# Patient Record
Sex: FEMALE | Race: WHITE | Employment: UNEMPLOYED | URBAN - METROPOLITAN AREA
[De-identification: names, ages, dates, MRNs, and addresses within clinical notes are randomized per-mention and may not be internally consistent; named-entity substitution may affect disease eponyms.]

---

## 2017-01-04 ENCOUNTER — OFFICE VISIT (OUTPATIENT)
Dept: OBGYN CLINIC | Facility: CLINIC | Age: 42
End: 2017-01-04

## 2017-01-04 ENCOUNTER — HOSPITAL ENCOUNTER (OUTPATIENT)
Dept: MAMMOGRAPHY | Facility: HOSPITAL | Age: 42
Discharge: HOME | End: 2017-01-04
Attending: OBSTETRICS & GYNECOLOGY
Payer: COMMERCIAL

## 2017-01-04 VITALS
HEIGHT: 61 IN | WEIGHT: 135 LBS | HEART RATE: 68 BPM | DIASTOLIC BLOOD PRESSURE: 63 MMHG | BODY MASS INDEX: 25.49 KG/M2 | SYSTOLIC BLOOD PRESSURE: 95 MMHG

## 2017-01-04 DIAGNOSIS — Z01.419 ENCOUNTER FOR GYNECOLOGICAL EXAMINATION: Primary | ICD-10-CM

## 2017-01-04 DIAGNOSIS — Z12.31 ENCOUNTER FOR SCREENING MAMMOGRAM FOR BREAST CANCER: ICD-10-CM

## 2017-01-04 PROCEDURE — 99385 PREV VISIT NEW AGE 18-39: CPT | Performed by: OBSTETRICS & GYNECOLOGY

## 2017-01-04 PROCEDURE — 77067 SCR MAMMO BI INCL CAD: CPT

## 2017-01-05 LAB — HPV I/H RISK 1 DNA SPEC QL NAA+PROBE: NEGATIVE

## 2017-01-06 NOTE — PROGRESS NOTES
Sheela Gomez is a 46year old female [place] [address] Patient's last menstrual period was 12/26/2016. here for annual exam.       New pt. Had 1st myomectomy at age 27. Had 4 myomectomies prior to hysterectomy. Had supracervical hysterectomy.   Gets monthly period kg)    Body mass index is 25.52 kg/(m^2).     Constitutional: well developed, well nourished  Neck/Thyroid: thyroid symmetric, no nodules  Heart:  Regular rate and rhythm  Lungs:  Clear to asculation  Breast: normal without palpable masses, tenderness, asym

## 2017-01-12 NOTE — PROGRESS NOTES
Quick Note:    Normal pap and negative HPV. But return to clinic in 1 year for annual. Send letter.   ______

## 2017-01-14 ENCOUNTER — HOSPITAL ENCOUNTER (OUTPATIENT)
Age: 42
Discharge: HOME | End: 2017-01-14
Payer: COMMERCIAL

## 2017-01-14 VITALS
SYSTOLIC BLOOD PRESSURE: 92 MMHG | TEMPERATURE: 98 F | OXYGEN SATURATION: 97 % | RESPIRATION RATE: 18 BRPM | DIASTOLIC BLOOD PRESSURE: 60 MMHG | HEART RATE: 68 BPM

## 2017-01-14 DIAGNOSIS — K11.20 PAROTITIS: Primary | ICD-10-CM

## 2017-01-14 PROCEDURE — 99204 OFFICE O/P NEW MOD 45 MIN: CPT

## 2017-01-14 PROCEDURE — 99213 OFFICE O/P EST LOW 20 MIN: CPT

## 2017-01-14 RX ORDER — AMOXICILLIN AND CLAVULANATE POTASSIUM 875; 125 MG/1; MG/1
1 TABLET, FILM COATED ORAL 2 TIMES DAILY
Qty: 20 TABLET | Refills: 0 | Status: SHIPPED | OUTPATIENT
Start: 2017-01-14 | End: 2017-01-24

## 2017-01-14 NOTE — ED INITIAL ASSESSMENT (HPI)
REPORTS LEFT SIDED FACIAL SWELLING AND PAIN SINCE THIS AM.  NO REDNESS NOTED. DENIES DENTAL PAIN. REPORTS MINOR EAR PAIN. PATIENT REPORTS RECENT URI.

## 2017-01-14 NOTE — ED PROVIDER NOTES
Patient presents with:  Swelling Edema (cardiovascular, metabolic)      HPI:     Ladonna Simon is a 46year old female who presents for evaluation and management of a chief complaint of left facial swelling and pain over the parotid gland.   She states she w area.  NECK: supple, no adenopathy  CARDIO: RRR without murmur  EXTREMITIES: no cyanosis, edema, BETH without difficulty  HEAD: normocephalic  EYES: sclera non icteric bilateral, conjunctiva clear  EARS: TM  bilateral: normal No mastoid redness, pain, or sw

## 2017-09-07 ENCOUNTER — HOSPITAL ENCOUNTER (OUTPATIENT)
Age: 42
Discharge: HOME | End: 2017-09-07
Attending: FAMILY MEDICINE
Payer: COMMERCIAL

## 2017-09-07 VITALS
WEIGHT: 128 LBS | OXYGEN SATURATION: 98 % | SYSTOLIC BLOOD PRESSURE: 99 MMHG | RESPIRATION RATE: 16 BRPM | TEMPERATURE: 98 F | HEIGHT: 61 IN | DIASTOLIC BLOOD PRESSURE: 63 MMHG | HEART RATE: 71 BPM | BODY MASS INDEX: 24.17 KG/M2

## 2017-09-07 DIAGNOSIS — J02.0 STREPTOCOCCAL SORE THROAT: Primary | ICD-10-CM

## 2017-09-07 LAB — S PYO AG THROAT QL: POSITIVE

## 2017-09-07 PROCEDURE — 87430 STREP A AG IA: CPT

## 2017-09-07 PROCEDURE — 99214 OFFICE O/P EST MOD 30 MIN: CPT

## 2017-09-07 PROCEDURE — 99213 OFFICE O/P EST LOW 20 MIN: CPT

## 2017-09-07 RX ORDER — AZITHROMYCIN 250 MG/1
TABLET, FILM COATED ORAL
Qty: 1 PACKAGE | Refills: 0 | Status: SHIPPED | OUTPATIENT
Start: 2017-09-07 | End: 2017-09-12

## 2017-09-07 NOTE — ED PROVIDER NOTES
Patient presents with:  Sore Throat    HPI:     Neda Hancock is a 46year old female who presents with for chief complaint of sore throat. Onset of symptoms was today.     The patient denies complaints of headache, neck pain, ear pain, swollen glands, diff normal. [id number] midline. Mucosa normal. No drainage or sinus tenderness. . No nasal flaring  Throat: lips, mucosa, and tongue normal; teeth and gums normal  Neck: no adenopathy  Lungs: clear to auscultation bilaterally. No chest wall retractions.  No respira

## 2017-09-07 NOTE — ED INITIAL ASSESSMENT (HPI)
PATIENT ARRIVED AMBULATORY TO ROOM C/O A SORE THROAT. PATIENT STATES THE SORE THROAT STARTED TODAY. PATIENT STATES \"ALL OF MY FAMILY HAS STREP\" +FEVERS. NO N/V/D.

## 2017-10-12 ENCOUNTER — HOSPITAL ENCOUNTER (OUTPATIENT)
Age: 42
Discharge: HOME | End: 2017-10-12
Payer: COMMERCIAL

## 2017-10-12 VITALS
HEIGHT: 61 IN | HEART RATE: 79 BPM | TEMPERATURE: 98 F | OXYGEN SATURATION: 100 % | RESPIRATION RATE: 16 BRPM | SYSTOLIC BLOOD PRESSURE: 104 MMHG | DIASTOLIC BLOOD PRESSURE: 77 MMHG | BODY MASS INDEX: 24.55 KG/M2 | WEIGHT: 130 LBS

## 2017-10-12 DIAGNOSIS — J02.9 PHARYNGITIS, UNSPECIFIED ETIOLOGY: Primary | ICD-10-CM

## 2017-10-12 PROCEDURE — 87430 STREP A AG IA: CPT

## 2017-10-12 PROCEDURE — 99213 OFFICE O/P EST LOW 20 MIN: CPT

## 2017-10-12 PROCEDURE — 99214 OFFICE O/P EST MOD 30 MIN: CPT

## 2017-10-12 RX ORDER — AMOXICILLIN 875 MG/1
875 TABLET, COATED ORAL 2 TIMES DAILY
Qty: 20 TABLET | Refills: 0 | Status: SHIPPED | OUTPATIENT
Start: 2017-10-12 | End: 2017-10-22

## 2017-10-12 NOTE — ED PROVIDER NOTES
Patient presents with:  Sore Throat      HPI:     Sherryle Coil is a 38year old female who presents for evaluation of a chief complaint of sore throat, congestion, ear pressure for the past 2 days. The patient states she is concerned she has strep throat. bilateral, conjunctiva clear  EARS: TM  bilateral: bulging  NOSE: nasal turbinates: swollen, red and clear drainage  THROAT: redness noted, uvula midline and airway patent  LUNGS: clear to auscultation bilaterally; no rales, rhonchi, or wheezes    MDM/Asse

## 2017-10-12 NOTE — ED INITIAL ASSESSMENT (HPI)
PATIENT ARRIVED AMBULATORY TO ROOM C/O A SORE THROAT STARTED YESTERDAY. PT DENIES N/V/D. DENIES COUGH. DENIES NASAL CONGESTION. NO FEVERS. PATIENT STATES \"I HAD STREP ABOUT A MONTH AGO\" EASY NON LABORED RESPIRATIONS.  NO DISTRESS

## 2017-11-01 ENCOUNTER — HOSPITAL ENCOUNTER (OUTPATIENT)
Age: 42
Discharge: HOME | End: 2017-11-01
Attending: FAMILY MEDICINE
Payer: COMMERCIAL

## 2017-11-01 VITALS
TEMPERATURE: 98 F | SYSTOLIC BLOOD PRESSURE: 100 MMHG | HEART RATE: 78 BPM | BODY MASS INDEX: 26.43 KG/M2 | WEIGHT: 140 LBS | HEIGHT: 61 IN | RESPIRATION RATE: 20 BRPM | DIASTOLIC BLOOD PRESSURE: 73 MMHG | OXYGEN SATURATION: 99 %

## 2017-11-01 DIAGNOSIS — J06.9 VIRAL UPPER RESPIRATORY TRACT INFECTION: Primary | ICD-10-CM

## 2017-11-01 PROCEDURE — 99212 OFFICE O/P EST SF 10 MIN: CPT

## 2017-11-01 NOTE — ED PROVIDER NOTES
Patient Seen in: 5 [facility]    History   Patient presents with:  Ear Problem Pain (neurosensory)    Stated Complaint: ear problem    HPI    Pt is a 39 yo who presents with a 1 day h/o nasal congestion and ear pressure.  No and regular rhythm. Pulmonary/Chest: Effort normal and breath sounds normal.   Neurological: She is alert and oriented to person, place, and time. Skin: Skin is warm. Psychiatric: She has a normal mood and affect.  Her behavior is normal.   Nursing n

## 2017-12-15 ENCOUNTER — HOSPITAL ENCOUNTER (OUTPATIENT)
Age: 42
Discharge: HOME | End: 2017-12-15
Attending: EMERGENCY MEDICINE
Payer: COMMERCIAL

## 2017-12-15 VITALS
TEMPERATURE: 99 F | DIASTOLIC BLOOD PRESSURE: 79 MMHG | SYSTOLIC BLOOD PRESSURE: 111 MMHG | HEART RATE: 73 BPM | HEIGHT: 61 IN | BODY MASS INDEX: 26.24 KG/M2 | OXYGEN SATURATION: 99 % | WEIGHT: 139 LBS | RESPIRATION RATE: 18 BRPM

## 2017-12-15 DIAGNOSIS — J06.9 UPPER RESPIRATORY TRACT INFECTION, UNSPECIFIED TYPE: Primary | ICD-10-CM

## 2017-12-15 PROCEDURE — 99212 OFFICE O/P EST SF 10 MIN: CPT

## 2017-12-15 NOTE — ED PROVIDER NOTES
Patient presents with:  Cough/URI      HPI:     Renée Christopher is a 38year old female who presents for evaluation of a chief complaint of  an ear pressure sensation Onset of symptoms was 3 weeks ago. The patient also complains of cough.   Patient states she antibiotics did not appear indicated advised symptomatic treatment with use of over-the-counter medications  No orders of the defined types were placed in this encounter.       Labs performed this visit:  No results found for this or any previous visit (fro

## 2018-02-10 ENCOUNTER — HOSPITAL ENCOUNTER (OUTPATIENT)
Dept: MAMMOGRAPHY | Facility: HOSPITAL | Age: 43
Discharge: HOME | End: 2018-02-10
Attending: OBSTETRICS & GYNECOLOGY
Payer: COMMERCIAL

## 2018-02-10 ENCOUNTER — OFFICE VISIT (OUTPATIENT)
Dept: OBGYN CLINIC | Facility: CLINIC | Age: 43
End: 2018-02-10

## 2018-02-10 VITALS
HEIGHT: 61 IN | HEART RATE: 72 BPM | SYSTOLIC BLOOD PRESSURE: 100 MMHG | BODY MASS INDEX: 26.62 KG/M2 | WEIGHT: 141 LBS | DIASTOLIC BLOOD PRESSURE: 66 MMHG

## 2018-02-10 DIAGNOSIS — Z01.419 ENCOUNTER FOR GYNECOLOGICAL EXAMINATION WITHOUT ABNORMAL FINDING: Primary | ICD-10-CM

## 2018-02-10 DIAGNOSIS — Z12.31 ENCOUNTER FOR SCREENING MAMMOGRAM FOR BREAST CANCER: ICD-10-CM

## 2018-02-10 PROCEDURE — 77067 SCR MAMMO BI INCL CAD: CPT | Performed by: OBSTETRICS & GYNECOLOGY

## 2018-02-10 PROCEDURE — 99396 PREV VISIT EST AGE 40-64: CPT | Performed by: OBSTETRICS & GYNECOLOGY

## 2018-02-10 NOTE — PROGRESS NOTES
Well Woman Exam    HPI:  The patient is a 44yo female who presents for annual exam. Pt has no complaints. S/p Supracervical hyst and has irregular light menses. Pt has had multiple myomectomies as well and ended up adopting her two children.  Denies gyn con back pain. Skin/Breast:  Denies any breast pain, lumps, or discharge. Neurological:  denies headaches, extremity weakness  Psychiatric: denies depression or anxiety.        02/10/18  1043   BP: 100/66   Pulse: 72       PHYSICAL EXAM:   Constitutional: we

## 2018-05-05 ENCOUNTER — HOSPITAL ENCOUNTER (OUTPATIENT)
Age: 43
Discharge: HOME | End: 2018-05-05
Attending: FAMILY MEDICINE
Payer: COMMERCIAL

## 2018-05-05 VITALS
RESPIRATION RATE: 18 BRPM | HEIGHT: 61 IN | WEIGHT: 135 LBS | DIASTOLIC BLOOD PRESSURE: 72 MMHG | BODY MASS INDEX: 25.49 KG/M2 | OXYGEN SATURATION: 99 % | TEMPERATURE: 98 F | HEART RATE: 71 BPM | SYSTOLIC BLOOD PRESSURE: 115 MMHG

## 2018-05-05 DIAGNOSIS — J02.0 STREPTOCOCCAL SORE THROAT: Primary | ICD-10-CM

## 2018-05-05 PROCEDURE — 99213 OFFICE O/P EST LOW 20 MIN: CPT

## 2018-05-05 PROCEDURE — 99214 OFFICE O/P EST MOD 30 MIN: CPT

## 2018-05-05 PROCEDURE — 87430 STREP A AG IA: CPT

## 2018-05-05 RX ORDER — AMOXICILLIN 875 MG/1
875 TABLET, COATED ORAL 2 TIMES DAILY
Qty: 20 TABLET | Refills: 0 | Status: SHIPPED | OUTPATIENT
Start: 2018-05-05 | End: 2018-05-15

## 2018-05-05 NOTE — ED PROVIDER NOTES
Patient presents with:  Sore Throat      HPI:     Gary Bain is a 38year old female who presents with for chief complaint of nasal congestion, sore throat, fatigue. X 3 days.     The patient denies complaints of  neck pain, ear pain, difficulty breathin respiratory distress.  No tachypnea noted  CARDIOVASCULAR:   Heart: S1, S2 normal, no murmur, click, rub or gallop, regular rate and rhythm  GI - Abdomen: soft, non-tender; bowel sounds normal; no masses,  no organomegaly  Skin: Skin color, texture, turgor

## 2018-05-05 NOTE — ED INITIAL ASSESSMENT (HPI)
Sore throat that started Thursday. Patient states that her throat pain is sharp. Patient also complains  Of head pressure. Minimal coughing. Denies fevers. Denies CP and SOB. Patient used flonase last night, motrin last taken last night.

## 2018-05-10 ENCOUNTER — HOSPITAL ENCOUNTER (OUTPATIENT)
Age: 43
Discharge: HOME | End: 2018-05-10
Attending: FAMILY MEDICINE
Payer: COMMERCIAL

## 2018-05-10 VITALS
RESPIRATION RATE: 16 BRPM | WEIGHT: 135 LBS | BODY MASS INDEX: 25.49 KG/M2 | TEMPERATURE: 98 F | HEART RATE: 94 BPM | OXYGEN SATURATION: 99 % | HEIGHT: 61 IN | SYSTOLIC BLOOD PRESSURE: 100 MMHG | DIASTOLIC BLOOD PRESSURE: 63 MMHG

## 2018-05-10 DIAGNOSIS — H10.9 CONJUNCTIVITIS OF BOTH EYES, UNSPECIFIED CONJUNCTIVITIS TYPE: Primary | ICD-10-CM

## 2018-05-10 PROCEDURE — 99213 OFFICE O/P EST LOW 20 MIN: CPT

## 2018-05-10 PROCEDURE — 99214 OFFICE O/P EST MOD 30 MIN: CPT

## 2018-05-10 RX ORDER — TOBRAMYCIN 3 MG/ML
2 SOLUTION/ DROPS OPHTHALMIC
Qty: 1 BOTTLE | Refills: 0 | Status: SHIPPED | OUTPATIENT
Start: 2018-05-10 | End: 2018-05-15

## 2018-05-10 RX ORDER — FLUTICASONE PROPIONATE 50 MCG
SPRAY, SUSPENSION (ML) NASAL DAILY
COMMUNITY
End: 2020-01-29

## 2018-05-10 NOTE — ED INITIAL ASSESSMENT (HPI)
Pt reporting redness and discharge in both eyes for past couple of days. States she was diagnosed with Strep 5 days ago and still on Amoxicillin. States eyes feeling irritated.

## 2018-05-10 NOTE — ED PROVIDER NOTES
Patient Seen in: 5 [facility]    History   Patient presents with:  Conjunctivitis    Stated Complaint: Eye redness     HPI    Pt complains of bilateral eye redness  And discharge for 2  days .    Associated symptoms : no visua injected on both  Cornea: nl  EOMI intact PERRLA  Ant chambers: nl inspection    ENT:  mmm, no lesions  Neck: supple, no LAD  Skin: warm and dry, no rash, no laceration            ED Course   Labs Reviewed - No data to display    MDM   Pt is a 39 yo with b

## 2018-08-28 ENCOUNTER — TELEPHONE (OUTPATIENT)
Dept: OBGYN CLINIC | Facility: CLINIC | Age: 43
End: 2018-08-28

## 2019-01-17 ENCOUNTER — OFFICE VISIT (OUTPATIENT)
Dept: OBGYN CLINIC | Facility: CLINIC | Age: 44
End: 2019-01-17
Payer: COMMERCIAL

## 2019-01-17 VITALS
WEIGHT: 139 LBS | DIASTOLIC BLOOD PRESSURE: 73 MMHG | HEART RATE: 77 BPM | BODY MASS INDEX: 26 KG/M2 | SYSTOLIC BLOOD PRESSURE: 105 MMHG

## 2019-01-17 DIAGNOSIS — Z12.31 ENCOUNTER FOR SCREENING MAMMOGRAM FOR BREAST CANCER: ICD-10-CM

## 2019-01-17 DIAGNOSIS — Z01.419 ENCOUNTER FOR GYNECOLOGICAL EXAMINATION WITHOUT ABNORMAL FINDING: Primary | ICD-10-CM

## 2019-01-17 PROCEDURE — 99396 PREV VISIT EST AGE 40-64: CPT | Performed by: OBSTETRICS & GYNECOLOGY

## 2019-01-17 NOTE — PROGRESS NOTES
Well Woman Exam    HPI:  The patient is a 43yo female who presents for annual exam. She has no complaints. She is looking for a marriage counselor as she feels her and her  are \"two ships in the night. \" She feels that they may separate.  She is loo file.    MEDICATIONS:    Current Outpatient Medications:   •  Fluticasone Propionate 50 MCG/ACT Nasal Suspension, by Each Nare route daily. , Disp: , Rfl:   •  Pseudoeph-Doxylamine-DM-APAP (TYLENOL COLD/FLU SEVERE OR), Take by mouth as needed. , Disp: , Rfl: 1. Reviewed ASCCP guidelines with the patient   2. Cotesting negative 2017- repeat 2020  2. Contraception:   1. S/p supracervical hyst  3. Breast Health:     1.  Reviewed current guidelines with recommendation to start mammograms at age 38; however, ACOG

## 2019-02-16 ENCOUNTER — HOSPITAL ENCOUNTER (OUTPATIENT)
Dept: MAMMOGRAPHY | Facility: HOSPITAL | Age: 44
Discharge: HOME | End: 2019-02-16
Attending: OBSTETRICS & GYNECOLOGY
Payer: COMMERCIAL

## 2019-02-16 DIAGNOSIS — Z12.31 ENCOUNTER FOR SCREENING MAMMOGRAM FOR BREAST CANCER: ICD-10-CM

## 2019-02-16 PROCEDURE — 77063 BREAST TOMOSYNTHESIS BI: CPT | Performed by: OBSTETRICS & GYNECOLOGY

## 2019-02-16 PROCEDURE — 77067 SCR MAMMO BI INCL CAD: CPT | Performed by: OBSTETRICS & GYNECOLOGY

## 2020-01-29 ENCOUNTER — OFFICE VISIT (OUTPATIENT)
Dept: OBGYN CLINIC | Facility: CLINIC | Age: 45
End: 2020-01-29

## 2020-01-29 VITALS
DIASTOLIC BLOOD PRESSURE: 63 MMHG | WEIGHT: 145.63 LBS | SYSTOLIC BLOOD PRESSURE: 99 MMHG | HEART RATE: 67 BPM | BODY MASS INDEX: 28 KG/M2

## 2020-01-29 DIAGNOSIS — Z12.4 CERVICAL CANCER SCREENING: ICD-10-CM

## 2020-01-29 DIAGNOSIS — Z12.31 ENCOUNTER FOR SCREENING MAMMOGRAM FOR BREAST CANCER: ICD-10-CM

## 2020-01-29 DIAGNOSIS — Z01.419 ENCOUNTER FOR GYNECOLOGICAL EXAMINATION WITHOUT ABNORMAL FINDING: Primary | ICD-10-CM

## 2020-01-29 PROCEDURE — 99396 PREV VISIT EST AGE 40-64: CPT | Performed by: OBSTETRICS & GYNECOLOGY

## 2020-01-29 NOTE — PROGRESS NOTES
Well Woman Exam    HPI:  The patient is a 39yo female who presents today for annual exam. She has no complaints. She did get  this last year. She is happy. Last pap 2017  She did have a supra-cervical hysterectomy.  She continues to get periods mo Attends [employer] service: Not on file        Active member of club or organization: Not on file        Attends meetings of clubs or organizations: Not on file        Relationship status: Not on file      Intimate partner violence:        Fear of current o hair distribution, and no lesions  Urethral Meatus:  normal in size, location, without lesions and prolapse  Bladder:  No fullness, masses or tenderness  Vagina:  Normal appearance without lesions, no abnormal discharge  Cervix:  Normal without tenderness

## 2020-01-30 ENCOUNTER — HOSPITAL ENCOUNTER (OUTPATIENT)
Age: 45
Discharge: HOME | End: 2020-01-30
Attending: EMERGENCY MEDICINE
Payer: COMMERCIAL

## 2020-01-30 VITALS
BODY MASS INDEX: 27 KG/M2 | OXYGEN SATURATION: 99 % | DIASTOLIC BLOOD PRESSURE: 56 MMHG | TEMPERATURE: 98 F | WEIGHT: 145 LBS | RESPIRATION RATE: 18 BRPM | HEART RATE: 82 BPM | SYSTOLIC BLOOD PRESSURE: 91 MMHG

## 2020-01-30 DIAGNOSIS — J06.9 VIRAL UPPER RESPIRATORY TRACT INFECTION WITH COUGH: Primary | ICD-10-CM

## 2020-01-30 LAB
HPV I/H RISK 1 DNA SPEC QL NAA+PROBE: NEGATIVE
S PYO AG THROAT QL: NEGATIVE

## 2020-01-30 PROCEDURE — 87430 STREP A AG IA: CPT

## 2020-01-30 PROCEDURE — 99214 OFFICE O/P EST MOD 30 MIN: CPT

## 2020-01-30 PROCEDURE — 99213 OFFICE O/P EST LOW 20 MIN: CPT

## 2020-01-30 RX ORDER — BENZONATATE 200 MG/1
200 CAPSULE ORAL 3 TIMES DAILY PRN
Qty: 15 CAPSULE | Refills: 0 | Status: SHIPPED | OUTPATIENT
Start: 2020-01-30 | End: 2020-02-04

## 2020-01-30 NOTE — ED INITIAL ASSESSMENT (HPI)
Patient reports bilateral ear \"fuzziness\"  Body aches and sore throat starting on Tuesday. States she feels tired. Denies fevers.

## 2020-01-30 NOTE — ED PROVIDER NOTES
Patient Seen in: [facility]      History   Patient presents with:  Sore Throat    Stated Complaint: SORE THROAT    HPI  Patient complains of a sore throat body aches and fatigue that started 2 or 3 days ago.   There is mil Tympanic membrane and external ear normal.      Left Ear: Tympanic membrane and external ear normal.      Nose: Rhinorrhea present. Mouth/Throat:      Mouth: Mucous membranes are dry. Pharynx: Posterior oropharyngeal erythema (Mild) present.  No p Prescribed:  Current Discharge Medication List    START taking these medications    benzonatate 200 MG Oral Cap  Take 1 capsule (200 mg total) by mouth 3 (three) times daily as needed for cough.   Qty: 15 capsule Refills: 0

## 2020-02-08 ENCOUNTER — HOSPITAL ENCOUNTER (OUTPATIENT)
Dept: MAMMOGRAPHY | Facility: HOSPITAL | Age: 45
Discharge: HOME | End: 2020-02-08
Attending: OBSTETRICS & GYNECOLOGY
Payer: COMMERCIAL

## 2020-02-08 DIAGNOSIS — Z12.31 ENCOUNTER FOR SCREENING MAMMOGRAM FOR BREAST CANCER: ICD-10-CM

## 2020-02-08 PROCEDURE — 77067 SCR MAMMO BI INCL CAD: CPT | Performed by: OBSTETRICS & GYNECOLOGY

## 2020-02-08 PROCEDURE — 77063 BREAST TOMOSYNTHESIS BI: CPT | Performed by: OBSTETRICS & GYNECOLOGY

## 2020-03-26 ENCOUNTER — E-VISIT (OUTPATIENT)
Dept: FAMILY MEDICINE CLINIC | Facility: CLINIC | Age: 45
End: 2020-03-26

## 2020-03-26 DIAGNOSIS — R05.9 COUGH: Primary | ICD-10-CM

## 2020-11-06 ENCOUNTER — IMMUNIZATION (OUTPATIENT)
Dept: FAMILY MEDICINE CLINIC | Facility: CLINIC | Age: 45
End: 2020-11-06

## 2020-11-06 DIAGNOSIS — Z23 NEED FOR VACCINATION: ICD-10-CM

## 2020-11-06 PROCEDURE — 90471 IMMUNIZATION ADMIN: CPT | Performed by: NURSE PRACTITIONER

## 2020-11-06 PROCEDURE — 90686 IIV4 VACC NO PRSV 0.5 ML IM: CPT | Performed by: NURSE PRACTITIONER

## 2021-02-09 ENCOUNTER — OFFICE VISIT (OUTPATIENT)
Dept: OBGYN CLINIC | Facility: CLINIC | Age: 46
End: 2021-02-09

## 2021-02-09 VITALS
DIASTOLIC BLOOD PRESSURE: 73 MMHG | HEART RATE: 90 BPM | BODY MASS INDEX: 28 KG/M2 | SYSTOLIC BLOOD PRESSURE: 109 MMHG | WEIGHT: 150.81 LBS

## 2021-02-09 DIAGNOSIS — Z12.31 ENCOUNTER FOR SCREENING MAMMOGRAM FOR BREAST CANCER: ICD-10-CM

## 2021-02-09 DIAGNOSIS — Z01.419 ENCOUNTER FOR GYNECOLOGICAL EXAMINATION WITHOUT ABNORMAL FINDING: Primary | ICD-10-CM

## 2021-02-09 PROCEDURE — 3074F SYST BP LT 130 MM HG: CPT | Performed by: OBSTETRICS & GYNECOLOGY

## 2021-02-09 PROCEDURE — G0438 PPPS, INITIAL VISIT: HCPCS | Performed by: OBSTETRICS & GYNECOLOGY

## 2021-02-09 PROCEDURE — 99396 PREV VISIT EST AGE 40-64: CPT | Performed by: OBSTETRICS & GYNECOLOGY

## 2021-02-09 PROCEDURE — 3078F DIAST BP <80 MM HG: CPT | Performed by: OBSTETRICS & GYNECOLOGY

## 2021-02-09 NOTE — PROGRESS NOTES
Well Woman Exam    HPI:  The patient is a 46yo female who presents for annual exam. She is doing well. Recently bought a house. She has monthly spotting in light of supracervical hysterectomy. No other complaints.           Reviewed medical and surgical his Days per week: Not on file        Minutes per session: Not on file      Stress: Not on file    Relationships      Social connections        Talks on phone: Not on file        Gets together: Not on file        Attends Latter day service: Not on file masses  Skin/Hair: no unusual rashes or bruises  Extremities: no edema, no cyanosis  Psychiatric: Appropriate mood and affect    Pelvic Exam:  External Genitalia: normal appearance, hair distribution, and no lesions  Urethral Meatus:  normal in size, locat

## 2021-02-20 ENCOUNTER — HOSPITAL ENCOUNTER (OUTPATIENT)
Dept: MAMMOGRAPHY | Facility: HOSPITAL | Age: 46
Discharge: HOME | End: 2021-02-20
Attending: OBSTETRICS & GYNECOLOGY
Payer: COMMERCIAL

## 2021-02-20 DIAGNOSIS — Z12.31 ENCOUNTER FOR SCREENING MAMMOGRAM FOR BREAST CANCER: ICD-10-CM

## 2021-02-20 PROCEDURE — 77067 SCR MAMMO BI INCL CAD: CPT | Performed by: OBSTETRICS & GYNECOLOGY

## 2021-02-20 PROCEDURE — 77063 BREAST TOMOSYNTHESIS BI: CPT | Performed by: OBSTETRICS & GYNECOLOGY

## 2021-02-22 ENCOUNTER — TELEPHONE (OUTPATIENT)
Dept: OBGYN CLINIC | Facility: CLINIC | Age: 46
End: 2021-02-22

## 2021-02-22 NOTE — TELEPHONE ENCOUNTER
Pt states she saw mammo result in mychart and asking to discuss. Advised pt report states additional imaging is needed. Reassured pt this is common and it does not mean there is something wrong. Provided pt with # to call for appt.

## 2021-02-25 ENCOUNTER — HOSPITAL ENCOUNTER (OUTPATIENT)
Dept: MAMMOGRAPHY | Facility: HOSPITAL | Age: 46
Discharge: HOME | End: 2021-02-25
Attending: OBSTETRICS & GYNECOLOGY
Payer: COMMERCIAL

## 2021-02-25 ENCOUNTER — HOSPITAL ENCOUNTER (OUTPATIENT)
Dept: ULTRASOUND IMAGING | Facility: HOSPITAL | Age: 46
Discharge: HOME | End: 2021-02-25
Attending: OBSTETRICS & GYNECOLOGY
Payer: COMMERCIAL

## 2021-02-25 DIAGNOSIS — R92.8 ABNORMAL MAMMOGRAM: ICD-10-CM

## 2021-02-25 PROCEDURE — 77061 BREAST TOMOSYNTHESIS UNI: CPT | Performed by: OBSTETRICS & GYNECOLOGY

## 2021-02-25 PROCEDURE — 76642 ULTRASOUND BREAST LIMITED: CPT | Performed by: OBSTETRICS & GYNECOLOGY

## 2021-02-25 PROCEDURE — 77065 DX MAMMO INCL CAD UNI: CPT | Performed by: OBSTETRICS & GYNECOLOGY

## 2021-04-17 ENCOUNTER — OFFICE VISIT (OUTPATIENT)
Dept: FAMILY MEDICINE CLINIC | Facility: CLINIC | Age: 46
End: 2021-04-17

## 2021-04-17 VITALS
HEART RATE: 61 BPM | BODY MASS INDEX: 25.4 KG/M2 | HEIGHT: 60.75 IN | DIASTOLIC BLOOD PRESSURE: 65 MMHG | WEIGHT: 132.81 LBS | SYSTOLIC BLOOD PRESSURE: 90 MMHG | TEMPERATURE: 97 F

## 2021-04-17 DIAGNOSIS — Z00.00 ROUTINE MEDICAL EXAM: ICD-10-CM

## 2021-04-17 DIAGNOSIS — Z76.89 ENCOUNTER TO ESTABLISH CARE: Primary | ICD-10-CM

## 2021-04-17 PROCEDURE — 99386 PREV VISIT NEW AGE 40-64: CPT | Performed by: FAMILY MEDICINE

## 2021-04-17 PROCEDURE — 3078F DIAST BP <80 MM HG: CPT | Performed by: FAMILY MEDICINE

## 2021-04-17 PROCEDURE — 3008F BODY MASS INDEX DOCD: CPT | Performed by: FAMILY MEDICINE

## 2021-04-17 PROCEDURE — 3074F SYST BP LT 130 MM HG: CPT | Performed by: FAMILY MEDICINE

## 2021-04-17 NOTE — PROGRESS NOTES
Sanchez Atkinson is a 39year old female. Patient presents with:  Establish Care    HPI:   New to me. Had 2 doses of covid 19 - did fine. Some fatigue. Had gyne appointment in February - loss 20 lbs since then - started exercising and eating healthy.    Menses D, 25-HYDROXY; Future  - VITAMIN B12; Future          The patient indicates understanding of these issues and agrees to the plan.       Malcom Dale MD  4/17/2021  12:13 PM

## 2022-02-11 ENCOUNTER — OFFICE VISIT (OUTPATIENT)
Dept: OBGYN CLINIC | Facility: CLINIC | Age: 47
End: 2022-02-11
Payer: COMMERCIAL

## 2022-02-11 VITALS
BODY MASS INDEX: 25.32 KG/M2 | HEIGHT: 60 IN | SYSTOLIC BLOOD PRESSURE: 95 MMHG | HEART RATE: 60 BPM | WEIGHT: 129 LBS | DIASTOLIC BLOOD PRESSURE: 63 MMHG

## 2022-02-11 DIAGNOSIS — Z12.31 SCREENING MAMMOGRAM FOR BREAST CANCER: ICD-10-CM

## 2022-02-11 DIAGNOSIS — Z12.4 SCREENING FOR MALIGNANT NEOPLASM OF CERVIX: ICD-10-CM

## 2022-02-11 DIAGNOSIS — R92.2 DENSE BREAST TISSUE ON MAMMOGRAM: ICD-10-CM

## 2022-02-11 DIAGNOSIS — Z01.419 ENCOUNTER FOR ANNUAL ROUTINE GYNECOLOGICAL EXAMINATION: Primary | ICD-10-CM

## 2022-02-11 PROCEDURE — 99396 PREV VISIT EST AGE 40-64: CPT | Performed by: NURSE PRACTITIONER

## 2022-02-11 PROCEDURE — 3008F BODY MASS INDEX DOCD: CPT | Performed by: NURSE PRACTITIONER

## 2022-02-11 PROCEDURE — G0438 PPPS, INITIAL VISIT: HCPCS | Performed by: NURSE PRACTITIONER

## 2022-02-11 PROCEDURE — 3078F DIAST BP <80 MM HG: CPT | Performed by: NURSE PRACTITIONER

## 2022-02-11 PROCEDURE — 3074F SYST BP LT 130 MM HG: CPT | Performed by: NURSE PRACTITIONER

## 2022-03-21 ENCOUNTER — HOSPITAL ENCOUNTER (OUTPATIENT)
Dept: MAMMOGRAPHY | Facility: HOSPITAL | Age: 47
Discharge: HOME | End: 2022-03-21
Attending: NURSE PRACTITIONER
Payer: COMMERCIAL

## 2022-03-21 DIAGNOSIS — Z12.31 SCREENING MAMMOGRAM FOR BREAST CANCER: ICD-10-CM

## 2022-03-21 PROCEDURE — 77063 BREAST TOMOSYNTHESIS BI: CPT | Performed by: NURSE PRACTITIONER

## 2022-03-21 PROCEDURE — 77067 SCR MAMMO BI INCL CAD: CPT | Performed by: NURSE PRACTITIONER

## 2022-04-29 ENCOUNTER — LAB ENCOUNTER (OUTPATIENT)
Dept: LAB | Facility: HOSPITAL | Age: 47
End: 2022-04-29
Attending: NURSE PRACTITIONER
Payer: COMMERCIAL

## 2022-04-29 ENCOUNTER — TELEPHONE (OUTPATIENT)
Dept: OBGYN CLINIC | Facility: CLINIC | Age: 47
End: 2022-04-29

## 2022-04-29 DIAGNOSIS — R30.0 BURNING WITH URINATION: ICD-10-CM

## 2022-04-29 LAB
BILIRUB UR QL: NEGATIVE
COLOR UR: YELLOW
GLUCOSE UR-MCNC: NEGATIVE MG/DL
KETONES UR-MCNC: NEGATIVE MG/DL
NITRITE UR QL STRIP.AUTO: POSITIVE
PH UR: 6 [PH] (ref 5–8)
PROT UR-MCNC: 100 MG/DL
RBC #/AREA URNS AUTO: >10 /HPF
SP GR UR STRIP: 1.01 (ref 1–1.03)
UROBILINOGEN UR STRIP-ACNC: <2
VIT C UR-MCNC: NEGATIVE MG/DL
WBC #/AREA URNS AUTO: >50 /HPF
WBC CLUMPS UR QL AUTO: PRESENT /HPF

## 2022-04-29 PROCEDURE — 81001 URINALYSIS AUTO W/SCOPE: CPT

## 2022-04-29 PROCEDURE — 87077 CULTURE AEROBIC IDENTIFY: CPT

## 2022-04-29 PROCEDURE — 87186 SC STD MICRODIL/AGAR DIL: CPT

## 2022-04-29 PROCEDURE — 87086 URINE CULTURE/COLONY COUNT: CPT

## 2022-04-29 RX ORDER — NITROFURANTOIN 25; 75 MG/1; MG/1
100 CAPSULE ORAL 2 TIMES DAILY
Qty: 14 CAPSULE | Refills: 0 | Status: SHIPPED | OUTPATIENT
Start: 2022-04-29 | End: 2022-05-06

## 2022-04-29 RX ORDER — NITROFURANTOIN 25; 75 MG/1; MG/1
CAPSULE ORAL
Qty: 14 CAPSULE | Refills: 0 | Status: SHIPPED | OUTPATIENT
Start: 2022-04-29 | End: 2022-04-29

## 2022-04-29 NOTE — TELEPHONE ENCOUNTER
Pt is calling  She has blood in her urine  , asking to speak to nurse  Possible Uti   Pain ,  urinating

## 2022-04-29 NOTE — TELEPHONE ENCOUNTER
Patient concerned for UTI. Patient with new sexual partner x 1 week. Urine \"orangey/pink,\" pain with urination. UA ordered. Reviewed hygiene practices, urinate after IC. She is going out of town tomorrow. She will come in now for UA. Advised to call us for results if she doesn't hear back by afternoon.      Await UA

## 2022-04-29 NOTE — TELEPHONE ENCOUNTER
Positive UA, culture pending. To EMB to review and advise on treatment. Patient out of town tomorrow.

## 2022-04-29 NOTE — TELEPHONE ENCOUNTER
Informed pt that EMB reviewed UA, + blood, positive nitrates. Informed pt that rx sent in for Macrobid 100mg po bid x 7 days . Informed pt no intercourse for 7-10 days. Informed pt to push fluids and pt given pyelo precautions. Informed pt that the culture is pending. Informed pt when the culture comes back sometimes a antibiotic has to be changed.

## 2022-04-29 NOTE — TELEPHONE ENCOUNTER
rev'd UA- +blood, +nitrates. Will send in macrobid 100 mg PO BID x 7 days for patient to start. No intercourse for 7-10 days. Push fluids, please review pyelo precautions.  Culture pending    Dearl Market, APRN

## 2022-05-02 NOTE — PROGRESS NOTES
Patient notified to finish macrobid. Patient verbalized understanding. She states symptoms are much better.

## 2022-11-10 ENCOUNTER — OFFICE VISIT (OUTPATIENT)
Dept: FAMILY MEDICINE CLINIC | Facility: CLINIC | Age: 47
End: 2022-11-10
Payer: COMMERCIAL

## 2022-11-10 VITALS
SYSTOLIC BLOOD PRESSURE: 97 MMHG | TEMPERATURE: 98 F | HEIGHT: 60 IN | BODY MASS INDEX: 23.72 KG/M2 | DIASTOLIC BLOOD PRESSURE: 64 MMHG | WEIGHT: 120.81 LBS | HEART RATE: 61 BPM

## 2022-11-10 DIAGNOSIS — L65.9 ALOPECIA: ICD-10-CM

## 2022-11-10 DIAGNOSIS — Z00.00 ROUTINE MEDICAL EXAM: Primary | ICD-10-CM

## 2022-11-10 DIAGNOSIS — Z12.11 SCREEN FOR COLON CANCER: ICD-10-CM

## 2022-11-10 PROCEDURE — 3074F SYST BP LT 130 MM HG: CPT | Performed by: FAMILY MEDICINE

## 2022-11-10 PROCEDURE — 99396 PREV VISIT EST AGE 40-64: CPT | Performed by: FAMILY MEDICINE

## 2022-11-10 PROCEDURE — 90686 IIV4 VACC NO PRSV 0.5 ML IM: CPT | Performed by: FAMILY MEDICINE

## 2022-11-10 PROCEDURE — 90471 IMMUNIZATION ADMIN: CPT | Performed by: FAMILY MEDICINE

## 2022-11-10 PROCEDURE — 3078F DIAST BP <80 MM HG: CPT | Performed by: FAMILY MEDICINE

## 2022-11-10 PROCEDURE — 3008F BODY MASS INDEX DOCD: CPT | Performed by: FAMILY MEDICINE

## 2022-12-09 ENCOUNTER — NURSE ONLY (OUTPATIENT)
Facility: CLINIC | Age: 47
End: 2022-12-09

## 2022-12-09 DIAGNOSIS — Z12.11 SCREEN FOR COLON CANCER: Primary | ICD-10-CM

## 2022-12-09 NOTE — PROGRESS NOTES
Fiona Calero patient for a scheduled telephone colon screening. Patient is refusing PEG prep; requesting Rey Varghese MD preference: none   Insurance:  BCBS  CBC from:  N/a   PCP visit on: 11/20/2022      Anticoagulants:  no  Diabetic Meds:  no  BP meds(Ace inhibitors/ARB's):  no   Weight loss meds (phentermine/vyvanse):  no   Iron supplement (RX/OTC): MVI  Height & Weight/BMI: 5'/120lbs/23   Hx of Cardiac/CVA issues/(MI/Stroke): no  Devices Pacemaker/Defibrillator/Stents: no  Resp. Issues/Oxygen Use/JARRED/COPD: no  Issues w/Anesthesia: PONV     Symptoms (Y/N): no      Family history of colon cancer: no     Medications, pharmacy, and allergies verified with patient over the phone. Please advise on orders and prep, thank you.

## 2022-12-12 RX ORDER — POLYETHYLENE GLYCOL 3350, SODIUM CHLORIDE, SODIUM BICARBONATE, POTASSIUM CHLORIDE 420; 11.2; 5.72; 1.48 G/4L; G/4L; G/4L; G/4L
POWDER, FOR SOLUTION ORAL
Qty: 4000 ML | Refills: 0 | Status: SHIPPED | OUTPATIENT
Start: 2022-12-12

## 2022-12-12 NOTE — PROGRESS NOTES
Scheduling:  cln w/ mac w/ any md  Dx: crc screening        No recent labs on file and noted pcp placed ordered 11/2022. Advise labs to eval renal function. If normal, would consider suprep.     Sent split dose trilyte in interim    Thanks,  Becka

## 2022-12-13 NOTE — PROGRESS NOTES
Scheduled for:  Colonoscopy-screen [id number]    Provider Name:  Dr. Zeb Alcantar  Date:  Thursday, 3/9/2023  Location:  [facility]  Sedation:  MAC  Time:  10:45 am (pt is aware to arrive at 9:45 am)  Prep:  TBD- instructed patient to have labs drawn. Meds/Allergies Reconciled?:  Napoleon Myers, aprn Reviewed   Diagnosis with codes:  Colorectal cancer screening Z12.11  Was patient informed to call insurance with codes (Y/N): I confirmed [employer] with this patient. Referral sent?:  Referral was sent at the time of electronic surgical scheduling. [address] [facility] or [facility]  notified?:  I sent an electronic request to Endo Scheduling and received a confirmation today. Medication Orders:  DO NOT TAKE: Iron pills, herbal supplements, multi-vitamins, or diet medications (i.e. Phentermine/Vyvanse) for 7 days before exam.  Misc Orders:  Patient was informed that they will need a COVID 19 test prior to their procedure. Patient verbally understood & will await a phone call from [facility] to schedule. Further instructions given by staff:  Prep TBD once labs are drawn. I will f/u w/ patient and send prep instructions once labs are reviewed by APRN. Patient states she will go have her labs drawn soon. Once obtained they will be routed to CI for review.

## 2022-12-15 ENCOUNTER — LAB ENCOUNTER (OUTPATIENT)
Dept: LAB | Facility: HOSPITAL | Age: 47
End: 2022-12-15
Attending: FAMILY MEDICINE
Payer: COMMERCIAL

## 2022-12-15 DIAGNOSIS — Z00.00 ROUTINE MEDICAL EXAM: ICD-10-CM

## 2022-12-15 LAB
ALBUMIN SERPL-MCNC: 3.9 G/DL (ref 3.4–5)
ALBUMIN/GLOB SERPL: 1.1 {RATIO} (ref 1–2)
ALP LIVER SERPL-CCNC: 69 U/L
ALT SERPL-CCNC: 18 U/L
ANION GAP SERPL CALC-SCNC: 5 MMOL/L (ref 0–18)
AST SERPL-CCNC: 9 U/L (ref 15–37)
BASOPHILS # BLD AUTO: 0.04 X10(3) UL (ref 0–0.2)
BASOPHILS NFR BLD AUTO: 0.4 %
BILIRUB SERPL-MCNC: 0.3 MG/DL (ref 0.1–2)
BUN BLD-MCNC: 13 MG/DL (ref 7–18)
BUN/CREAT SERPL: 16.5 (ref 10–20)
CALCIUM BLD-MCNC: 9.4 MG/DL (ref 8.5–10.1)
CHLORIDE SERPL-SCNC: 109 MMOL/L (ref 98–112)
CHOLEST SERPL-MCNC: 143 MG/DL (ref ?–200)
CO2 SERPL-SCNC: 27 MMOL/L (ref 21–32)
CREAT BLD-MCNC: 0.79 MG/DL
DEPRECATED RDW RBC AUTO: 41.1 FL (ref 35.1–46.3)
EOSINOPHIL # BLD AUTO: 0.18 X10(3) UL (ref 0–0.7)
EOSINOPHIL NFR BLD AUTO: 1.7 %
ERYTHROCYTE [DISTWIDTH] IN BLOOD BY AUTOMATED COUNT: 12.5 % (ref 11–15)
FASTING PATIENT LIPID ANSWER: YES
FASTING STATUS PATIENT QL REPORTED: YES
GFR SERPLBLD BASED ON 1.73 SQ M-ARVRAT: 94 ML/MIN/1.73M2 (ref 60–?)
GLOBULIN PLAS-MCNC: 3.6 G/DL (ref 2.8–4.4)
GLUCOSE BLD-MCNC: 88 MG/DL (ref 70–99)
HCT VFR BLD AUTO: 41 %
HDLC SERPL-MCNC: 65 MG/DL (ref 40–59)
HGB BLD-MCNC: 13.6 G/DL
IMM GRANULOCYTES # BLD AUTO: 0.09 X10(3) UL (ref 0–1)
IMM GRANULOCYTES NFR BLD: 0.9 %
LDLC SERPL CALC-MCNC: 66 MG/DL (ref ?–100)
LYMPHOCYTES # BLD AUTO: 1.52 X10(3) UL (ref 1–4)
LYMPHOCYTES NFR BLD AUTO: 14.5 %
MCH RBC QN AUTO: 29.4 PG (ref 26–34)
MCHC RBC AUTO-ENTMCNC: 33.2 G/DL (ref 31–37)
MCV RBC AUTO: 88.6 FL
MONOCYTES # BLD AUTO: 0.66 X10(3) UL (ref 0.1–1)
MONOCYTES NFR BLD AUTO: 6.3 %
NEUTROPHILS # BLD AUTO: 8 X10 (3) UL (ref 1.5–7.7)
NEUTROPHILS # BLD AUTO: 8 X10(3) UL (ref 1.5–7.7)
NEUTROPHILS NFR BLD AUTO: 76.2 %
NONHDLC SERPL-MCNC: 78 MG/DL (ref ?–130)
OSMOLALITY SERPL CALC.SUM OF ELEC: 292 MOSM/KG (ref 275–295)
PLATELET # BLD AUTO: 201 10(3)UL (ref 150–450)
POTASSIUM SERPL-SCNC: 4.9 MMOL/L (ref 3.5–5.1)
PROT SERPL-MCNC: 7.5 G/DL (ref 6.4–8.2)
RBC # BLD AUTO: 4.63 X10(6)UL
SODIUM SERPL-SCNC: 141 MMOL/L (ref 136–145)
TRIGL SERPL-MCNC: 55 MG/DL (ref 30–149)
TSI SER-ACNC: 1.42 MIU/ML (ref 0.36–3.74)
VIT B12 SERPL-MCNC: 818 PG/ML (ref 193–986)
VIT D+METAB SERPL-MCNC: 27.8 NG/ML (ref 30–100)
VLDLC SERPL CALC-MCNC: 8 MG/DL (ref 0–30)
WBC # BLD AUTO: 10.5 X10(3) UL (ref 4–11)

## 2022-12-15 PROCEDURE — 85025 COMPLETE CBC W/AUTO DIFF WBC: CPT

## 2022-12-15 PROCEDURE — 82306 VITAMIN D 25 HYDROXY: CPT

## 2022-12-15 PROCEDURE — 80053 COMPREHEN METABOLIC PANEL: CPT

## 2022-12-15 PROCEDURE — 84443 ASSAY THYROID STIM HORMONE: CPT

## 2022-12-15 PROCEDURE — 36415 COLL VENOUS BLD VENIPUNCTURE: CPT

## 2022-12-15 PROCEDURE — 82607 VITAMIN B-12: CPT

## 2022-12-15 PROCEDURE — 80061 LIPID PANEL: CPT

## 2022-12-15 NOTE — PROGRESS NOTES
Osiel Aleman are available for review. Please prescribe sutab as requested if appropriate, thank you!

## 2022-12-19 RX ORDER — SODIUM, POTASSIUM,MAG SULFATES 17.5-3.13G
SOLUTION, RECONSTITUTED, ORAL ORAL
Qty: 1 EACH | Refills: 0 | Status: SHIPPED | OUTPATIENT
Start: 2022-12-19

## 2023-02-13 ENCOUNTER — OFFICE VISIT (OUTPATIENT)
Dept: OBGYN CLINIC | Facility: CLINIC | Age: 48
End: 2023-02-13
Payer: COMMERCIAL

## 2023-02-13 VITALS
WEIGHT: 122.81 LBS | HEIGHT: 60 IN | BODY MASS INDEX: 24.11 KG/M2 | SYSTOLIC BLOOD PRESSURE: 102 MMHG | DIASTOLIC BLOOD PRESSURE: 67 MMHG | HEART RATE: 71 BPM

## 2023-02-13 DIAGNOSIS — Z90.711 HISTORY OF HYSTERECTOMY, SUPRACERVICAL: ICD-10-CM

## 2023-02-13 DIAGNOSIS — Z11.3 ROUTINE SCREENING FOR STI (SEXUALLY TRANSMITTED INFECTION): ICD-10-CM

## 2023-02-13 DIAGNOSIS — Z01.419 WELL WOMAN EXAM WITH ROUTINE GYNECOLOGICAL EXAM: Primary | ICD-10-CM

## 2023-02-13 DIAGNOSIS — Z12.31 ENCOUNTER FOR SCREENING MAMMOGRAM FOR MALIGNANT NEOPLASM OF BREAST: ICD-10-CM

## 2023-02-13 DIAGNOSIS — Z12.4 SCREENING FOR CERVICAL CANCER: ICD-10-CM

## 2023-02-13 PROCEDURE — 3078F DIAST BP <80 MM HG: CPT | Performed by: NURSE PRACTITIONER

## 2023-02-13 PROCEDURE — 3008F BODY MASS INDEX DOCD: CPT | Performed by: NURSE PRACTITIONER

## 2023-02-13 PROCEDURE — G0438 PPPS, INITIAL VISIT: HCPCS | Performed by: NURSE PRACTITIONER

## 2023-02-13 PROCEDURE — 99396 PREV VISIT EST AGE 40-64: CPT | Performed by: NURSE PRACTITIONER

## 2023-02-13 PROCEDURE — 3074F SYST BP LT 130 MM HG: CPT | Performed by: NURSE PRACTITIONER

## 2023-02-14 LAB
C TRACH DNA SPEC QL NAA+PROBE: NEGATIVE
HPV I/H RISK 1 DNA SPEC QL NAA+PROBE: NEGATIVE
N GONORRHOEA DNA SPEC QL NAA+PROBE: NEGATIVE

## 2023-02-17 LAB — T VAGINALIS RRNA SPEC QL NAA+PROBE: NEGATIVE

## 2023-03-01 ENCOUNTER — TELEPHONE (OUTPATIENT)
Facility: CLINIC | Age: 48
End: 2023-03-01

## 2023-03-01 RX ORDER — SOD SULF/POT CHLORIDE/MAG SULF 1.479 G
1 TABLET ORAL AS DIRECTED
Qty: 24 TABLET | Refills: 0 | Status: SHIPPED | OUTPATIENT
Start: 2023-03-01 | End: 2023-03-03

## 2023-03-01 NOTE — TELEPHONE ENCOUNTER
Dr. Elder Archuleta    Patient requesting sutab instead of liquid bowel prep for upcoming colonoscopy. Pended orders if patients is an acceptable candidate for substitution.      Thank you

## 2023-03-01 NOTE — TELEPHONE ENCOUNTER
Patient contacted and message below given. MA, please send new Sutab prep instructions through [employer]. Thank you.

## 2023-03-09 ENCOUNTER — ANESTHESIA EVENT (OUTPATIENT)
Dept: ENDOSCOPY | Age: 48
End: 2023-03-09
Payer: COMMERCIAL

## 2023-03-09 ENCOUNTER — ANESTHESIA (OUTPATIENT)
Dept: ENDOSCOPY | Age: 48
End: 2023-03-09
Payer: COMMERCIAL

## 2023-03-09 ENCOUNTER — HOSPITAL ENCOUNTER (OUTPATIENT)
Age: 48
Setting detail: HOSPITAL OUTPATIENT SURGERY
Discharge: HOME | End: 2023-03-09
Attending: INTERNAL MEDICINE | Admitting: INTERNAL MEDICINE
Payer: COMMERCIAL

## 2023-03-09 VITALS
DIASTOLIC BLOOD PRESSURE: 51 MMHG | RESPIRATION RATE: 16 BRPM | HEIGHT: 60 IN | HEART RATE: 78 BPM | OXYGEN SATURATION: 100 % | WEIGHT: 120 LBS | BODY MASS INDEX: 23.56 KG/M2 | SYSTOLIC BLOOD PRESSURE: 98 MMHG

## 2023-03-09 DIAGNOSIS — Z12.11 SCREEN FOR COLON CANCER: ICD-10-CM

## 2023-03-09 PROCEDURE — 45385 COLONOSCOPY W/LESION REMOVAL: CPT | Performed by: INTERNAL MEDICINE

## 2023-03-09 PROCEDURE — 99070 SPECIAL SUPPLIES PHYS/QHP: CPT | Performed by: INTERNAL MEDICINE

## 2023-03-09 RX ORDER — LIDOCAINE HYDROCHLORIDE 10 MG/ML
INJECTION, SOLUTION EPIDURAL; INFILTRATION; INTRACAUDAL; PERINEURAL AS NEEDED
Status: DISCONTINUED | OUTPATIENT
Start: 2023-03-09 | End: 2023-03-09 | Stop reason: SURG

## 2023-03-09 RX ORDER — SODIUM CHLORIDE, SODIUM LACTATE, POTASSIUM CHLORIDE, CALCIUM CHLORIDE 600; 310; 30; 20 MG/100ML; MG/100ML; MG/100ML; MG/100ML
INJECTION, SOLUTION INTRAVENOUS CONTINUOUS
Status: DISCONTINUED | OUTPATIENT
Start: 2023-03-09 | End: 2023-03-09

## 2023-03-09 RX ADMIN — SODIUM CHLORIDE, SODIUM LACTATE, POTASSIUM CHLORIDE, CALCIUM CHLORIDE: 600; 310; 30; 20 INJECTION, SOLUTION INTRAVENOUS at 10:24:00

## 2023-03-09 RX ADMIN — LIDOCAINE HYDROCHLORIDE 50 MG: 10 INJECTION, SOLUTION EPIDURAL; INFILTRATION; INTRACAUDAL; PERINEURAL at 10:24:00

## 2023-03-09 NOTE — ANESTHESIA POSTPROCEDURE EVALUATION
Patient: Floyd Gabriel    Procedure Summary     Date: 03/09/23 Room / Location: [facility] / [facility] [facility] ENDO    Anesthesia Start: 1021 Anesthesia Stop:     Procedure: COLONOSCOPY Diagnosis:       Screen for colon cancer      (Polyp, hemorrhoids)    Surgeons: Deisi Hernandez MD Anesthesiologist:     Anesthesia Type: general ASA Status: 1          Anesthesia Type: general    Vitals Value Taken Time   BP 91/63 03/09/23 1046   Temp 98.6 03/09/23 1046   Pulse 81 03/09/23 1046   Resp 14 03/09/23 1046   SpO2 100 03/09/23 1046       [facility] AN Post Evaluation:   Patient Evaluated in PACU  Patient Participation: complete - patient participated  Level of Consciousness: awake  Pain Score: 0  Pain Management: adequate  Airway Patency:patent  Dental exam unchanged from preop  Yes    Cardiovascular Status: acceptable  Respiratory Status: acceptable  Postoperative Hydration acceptable      [address], CRNA  3/9/2023 10:46 AM

## 2023-03-09 NOTE — DISCHARGE INSTRUCTIONS

## 2023-03-16 ENCOUNTER — MED REC SCAN ONLY (OUTPATIENT)
Dept: GASTROENTEROLOGY | Facility: CLINIC | Age: 48
End: 2023-03-16

## 2023-05-25 ENCOUNTER — HOSPITAL ENCOUNTER (OUTPATIENT)
Dept: MAMMOGRAPHY | Facility: HOSPITAL | Age: 48
Discharge: HOME | End: 2023-05-25
Attending: NURSE PRACTITIONER
Payer: COMMERCIAL

## 2023-05-25 DIAGNOSIS — Z12.31 ENCOUNTER FOR SCREENING MAMMOGRAM FOR MALIGNANT NEOPLASM OF BREAST: ICD-10-CM

## 2023-05-25 PROCEDURE — 77067 SCR MAMMO BI INCL CAD: CPT | Performed by: NURSE PRACTITIONER

## 2023-05-25 PROCEDURE — 77063 BREAST TOMOSYNTHESIS BI: CPT | Performed by: NURSE PRACTITIONER

## 2023-07-07 ENCOUNTER — TELEPHONE (OUTPATIENT)
Dept: GASTROENTEROLOGY | Facility: CLINIC | Age: 48
End: 2023-07-07

## 2023-07-07 NOTE — TELEPHONE ENCOUNTER
Beulah Michel MD  [facility] Clinical Staff  GI staff: please place recall for colonoscopy in 5 years
Health maintenance updated. 5 year colonoscopy recall placed in patient outreach. Next due on 03/09/2028 per Dr. Connor Moses.
15-Marcial-2023 02:03

## 2023-12-18 ENCOUNTER — PATIENT MESSAGE (OUTPATIENT)
Dept: FAMILY MEDICINE CLINIC | Facility: CLINIC | Age: 48
End: 2023-12-18

## 2023-12-18 NOTE — TELEPHONE ENCOUNTER
From: Tera Bhatt  To: Karla Paniagua  Sent: 12/18/2023 8:28 AM CST  Subject: migraines    Hi. I've been experiencing more headaches per week for the last few months. Never had them before. I had a severe one (i think migraine) yesterday. What should I do? I would love to prevent them.

## 2023-12-19 ENCOUNTER — NURSE TRIAGE (OUTPATIENT)
Dept: FAMILY MEDICINE CLINIC | Facility: CLINIC | Age: 48
End: 2023-12-19

## 2023-12-19 ENCOUNTER — OFFICE VISIT (OUTPATIENT)
Dept: FAMILY MEDICINE CLINIC | Facility: CLINIC | Age: 48
End: 2023-12-19
Payer: COMMERCIAL

## 2023-12-19 VITALS
DIASTOLIC BLOOD PRESSURE: 72 MMHG | BODY MASS INDEX: 24.17 KG/M2 | WEIGHT: 128 LBS | HEIGHT: 61 IN | HEART RATE: 67 BPM | SYSTOLIC BLOOD PRESSURE: 109 MMHG

## 2023-12-19 DIAGNOSIS — G43.919 INTRACTABLE MIGRAINE WITHOUT STATUS MIGRAINOSUS, UNSPECIFIED MIGRAINE TYPE: Primary | ICD-10-CM

## 2023-12-19 DIAGNOSIS — Z01.00 EYE EXAM, ROUTINE: ICD-10-CM

## 2023-12-19 PROCEDURE — 99214 OFFICE O/P EST MOD 30 MIN: CPT | Performed by: NURSE PRACTITIONER

## 2023-12-19 PROCEDURE — 3074F SYST BP LT 130 MM HG: CPT | Performed by: NURSE PRACTITIONER

## 2023-12-19 PROCEDURE — 3078F DIAST BP <80 MM HG: CPT | Performed by: NURSE PRACTITIONER

## 2023-12-19 PROCEDURE — 3008F BODY MASS INDEX DOCD: CPT | Performed by: NURSE PRACTITIONER

## 2023-12-19 RX ORDER — SUMATRIPTAN 50 MG/1
50 TABLET, FILM COATED ORAL EVERY 2 HOUR PRN
Qty: 9 TABLET | Refills: 1 | Status: SHIPPED | OUTPATIENT
Start: 2023-12-19

## 2023-12-19 RX ORDER — NAPROXEN 500 MG/1
500 TABLET ORAL 2 TIMES DAILY PRN
Qty: 60 TABLET | Refills: 0 | Status: SHIPPED | OUTPATIENT
Start: 2023-12-19

## 2023-12-19 NOTE — TELEPHONE ENCOUNTER
Action Requested: Summary for Provider     []  Critical Lab, Recommendations Needed  [] Need Additional Advice  []   FYI    []   Need Orders  [] Need Medications Sent to Pharmacy  []  Other     SUMMARY: Patient reports migraine headache that started about a week ago. Per patient states she feels better today, however feels like migraine will come back. Denies: vision changes, dizziness/lightheadedness, nausea  Appt made with available provider. Patient agreed with appt time and date.   Future Appointments   Date Time Provider Amadeo Salazar   12/19/2023  1:00 PM KASSANDRA [facility] ADO           Reason for call: Migraine  Onset: 1 week ago      Reason for Disposition   Unexplained headache that is present > 24 hours    Protocols used: Headache-A-OH

## 2023-12-30 ENCOUNTER — HOSPITAL ENCOUNTER (OUTPATIENT)
Age: 48
Discharge: HOME | End: 2023-12-30
Attending: EMERGENCY MEDICINE
Payer: COMMERCIAL

## 2023-12-30 ENCOUNTER — APPOINTMENT (OUTPATIENT)
Dept: CT IMAGING | Age: 48
End: 2023-12-30
Attending: EMERGENCY MEDICINE
Payer: COMMERCIAL

## 2023-12-30 VITALS
HEART RATE: 73 BPM | RESPIRATION RATE: 20 BRPM | OXYGEN SATURATION: 98 % | SYSTOLIC BLOOD PRESSURE: 104 MMHG | TEMPERATURE: 97 F | DIASTOLIC BLOOD PRESSURE: 70 MMHG

## 2023-12-30 DIAGNOSIS — N83.202 CYST OF LEFT OVARY: Primary | ICD-10-CM

## 2023-12-30 LAB
#MXD IC: 0.5 X10ˆ3/UL (ref 0.1–1)
B-HCG UR QL: NEGATIVE
BILIRUB UR QL STRIP: NEGATIVE
BUN BLD-MCNC: 10 MG/DL (ref 7–18)
CHLORIDE BLD-SCNC: 104 MMOL/L (ref 98–112)
CLARITY UR: CLEAR
CO2 BLD-SCNC: 28 MMOL/L (ref 21–32)
COLOR UR: YELLOW
CREAT BLD-MCNC: 0.7 MG/DL
EGFRCR SERPLBLD CKD-EPI 2021: 108 ML/MIN/1.73M2 (ref 60–?)
GLUCOSE BLD-MCNC: 85 MG/DL (ref 70–99)
GLUCOSE UR STRIP-MCNC: NEGATIVE MG/DL
HCT VFR BLD AUTO: 39.2 %
HCT VFR BLD CALC: 39 %
HGB BLD-MCNC: 13.3 G/DL
HGB UR QL STRIP: NEGATIVE
ISTAT IONIZED CALCIUM FOR CHEM 8: 1.19 MMOL/L (ref 1.12–1.32)
KETONES UR STRIP-MCNC: NEGATIVE MG/DL
LEUKOCYTE ESTERASE UR QL STRIP: NEGATIVE
LYMPHOCYTES # BLD AUTO: 1.5 X10ˆ3/UL (ref 1–4)
LYMPHOCYTES NFR BLD AUTO: 24.1 %
MCH RBC QN AUTO: 28.7 PG (ref 26–34)
MCHC RBC AUTO-ENTMCNC: 33.9 G/DL (ref 31–37)
MCV RBC AUTO: 84.7 FL (ref 80–100)
MIXED CELL %: 8 %
NEUTROPHILS # BLD AUTO: 4.4 X10ˆ3/UL (ref 1.5–7.7)
NEUTROPHILS NFR BLD AUTO: 67.9 %
NITRITE UR QL STRIP: NEGATIVE
PH UR STRIP: 7 [PH]
PLATELET # BLD AUTO: 198 X10ˆ3/UL (ref 150–450)
POTASSIUM BLD-SCNC: 4.2 MMOL/L (ref 3.6–5.1)
PROT UR STRIP-MCNC: NEGATIVE MG/DL
RBC # BLD AUTO: 4.63 X10ˆ6/UL
SODIUM BLD-SCNC: 141 MMOL/L (ref 136–145)
SP GR UR STRIP: <=1.005
UROBILINOGEN UR STRIP-ACNC: <2 MG/DL
WBC # BLD AUTO: 6.4 X10ˆ3/UL (ref 4–11)

## 2023-12-30 PROCEDURE — 74176 CT ABD & PELVIS W/O CONTRAST: CPT | Performed by: EMERGENCY MEDICINE

## 2023-12-30 PROCEDURE — 80047 BASIC METABLC PNL IONIZED CA: CPT

## 2023-12-30 PROCEDURE — 81002 URINALYSIS NONAUTO W/O SCOPE: CPT

## 2023-12-30 PROCEDURE — 85025 COMPLETE CBC W/AUTO DIFF WBC: CPT | Performed by: EMERGENCY MEDICINE

## 2023-12-30 PROCEDURE — 36415 COLL VENOUS BLD VENIPUNCTURE: CPT

## 2023-12-30 PROCEDURE — 99215 OFFICE O/P EST HI 40 MIN: CPT

## 2023-12-30 PROCEDURE — 99204 OFFICE O/P NEW MOD 45 MIN: CPT

## 2023-12-30 PROCEDURE — 81025 URINE PREGNANCY TEST: CPT

## 2023-12-30 NOTE — ED INITIAL ASSESSMENT (HPI)
Patient reports left lower back pain last night. States 2-3 weeks ago she had dysuria x 2. Denies gross hematuria.

## 2023-12-30 NOTE — DISCHARGE INSTRUCTIONS
Thank you for visiting our immediate care for your health care needs. Please follow up with your gynecologist next week for ultrasound. If you have any additional problems please return to the immediate care. Please take Tylenol and or Motrin for pain.

## 2024-01-02 ENCOUNTER — TELEPHONE (OUTPATIENT)
Dept: OBGYN CLINIC | Facility: CLINIC | Age: 49
End: 2024-01-02

## 2024-01-02 DIAGNOSIS — N83.202 LEFT OVARIAN CYST: Primary | ICD-10-CM

## 2024-01-02 NOTE — TELEPHONE ENCOUNTER
Pt was seen in UC for left flank pain, left ovarian cyst noted on CT. Pt states the pain was intense for 24 hrs that is why she went.  Pt states pain now comes and goes, 2-3/10. Notes to be throbbing.  Pt states she has not needed OTC meds. Pt calling asking for f/u pelvic ultrasound order. Pt states she would like to complete the order and then will schedule with EMB. Pt informed will route to EMB for recs. Pt given pain precautions.     To EMB to please review and advise. Thank you.

## 2024-01-02 NOTE — TELEPHONE ENCOUNTER
Pt was seen at urgent care on 12/30 for pain. Pt diagnosed with irregular cyst. Needs to have ultrasound and follow up with ob/gyn.Pt has HMO; would need order for ultrasound.  Would like to speak with Betty or Nurse before ultrasound if possible. Please call.

## 2024-01-03 NOTE — TELEPHONE ENCOUNTER
Please order pelvic US and get patient in asap, then have follow up with MD asap. As long as not severe pain or nausea or vomiting. if severe pain needs to go to ER.

## 2024-01-03 NOTE — TELEPHONE ENCOUNTER
Spoke with Gissell in US dept, she is able to schedule pt for an appt for tomorrow, 1/4/24 at 3:30p for pelvic US. Provided instructions for bladder prep and directions to pt. Provided ER pain recs to pt and pt verbalized understanding. Informed pt we will f/u with next steps after receiving US results. Pt appreciative.

## 2024-01-04 ENCOUNTER — HOSPITAL ENCOUNTER (OUTPATIENT)
Dept: ULTRASOUND IMAGING | Facility: HOSPITAL | Age: 49
Discharge: HOME | End: 2024-01-04
Attending: NURSE PRACTITIONER
Payer: COMMERCIAL

## 2024-01-04 DIAGNOSIS — N83.202 LEFT OVARIAN CYST: ICD-10-CM

## 2024-01-04 PROCEDURE — 76856 US EXAM PELVIC COMPLETE: CPT | Performed by: NURSE PRACTITIONER

## 2024-01-04 PROCEDURE — 76830 TRANSVAGINAL US NON-OB: CPT | Performed by: NURSE PRACTITIONER

## 2024-01-05 ENCOUNTER — TELEPHONE (OUTPATIENT)
Dept: OBGYN CLINIC | Facility: CLINIC | Age: 49
End: 2024-01-05

## 2024-01-05 DIAGNOSIS — N83.8 OVARIAN MASS, LEFT: Primary | ICD-10-CM

## 2024-01-05 NOTE — TELEPHONE ENCOUNTER
Patient seen in ER on 12/30 due to pain in left low back couldn't sleep, severe pain. Tried motrin tylenol and no improvement. She was worried had kidney infection due to had symptoms 2 weeks prior. She was alone with kids and couldn't go to ER. Was able to go to sleep.    Next day went to ER but no pain. CT of abdomen and pelvis showed a complex appearing multiloculated left ovarian cystic lesion on 12/30/2023. Small free fluid. She has had no pain since that night    Hx of supracervical hysterectomy due to fibroids done. She also thinks she had RSO at same time, \"very long surgery\" had a blood transfusion. She reports was told had a lot of scar tissue.  Prior hx of multiple myomectomy prior to hysterectomy.      Rev'd US below. Recommend evaluation with gyne oncology due to cystic mass in left ovary. Referral to Dr. White for evaluation and likely surgical management.    Also rev'd US shows area in right adnexa that appears to be a normal ovary.    PACS Images     Show images for US PELVIS W EV (CPT=76856/99626)  Study Result    Narrative   PROCEDURE: US PELVIS W EV (CPT=76856/22162)     COMPARISON: [facility], CT ABDOMEN+PELVIS KIDNEYSTONE 2D RNDR(NO IV,NO ORAL)(CPT=74176), 12/30/2023, 11:25 AM.     INDICATIONS: Left ovarian cyst.  Patient reports a history of a hysterectomy in 2011 and right oophorectomy.     TECHNIQUE: Pelvic ultrasound using transabdominal and transvaginal technique.  A transvaginal scan was performed for better assessment of the uterus and adnexal structures.     FINDINGS:  UTERUS:   The uterus is absent.  No gross abnormality noted along the vaginal cuff.     OVARIES AND ADNEXA:     RIGHT:   While the patient reports a history of a right oophorectomy, there is a structure in the right adnexa that is seen from a transabdominal approach and resembles a normal-appearing ovary measuring 3.1 x 2.7 x 2.2 cm.  LEFT:   A complex cystic mass is again noted in the  left adnexa measuring 7.8 x 5.7 x 5.5 cm, likely unchanged from the preceding CT.  This mass probably originates from the left ovary, although normal ovarian parenchyma is not identified with certainty.    Multiple anechoic and hypoechoic fluid-filled compartments are noted with intervening thick septations, some of which demonstrate septal blood flow.     CUL-DE-SAC:   Normal.  No free fluid or mass.    OTHER: Negative.  Bladder appears normal.              Impression   CONCLUSION:  1. Grossly stable complex cystic mass in the left adnexa measuring 7.8 cm, probably arising from the left ovary.  Differential considerations include benign and malignant ovarian neoplasms, a tubo-ovarian abscess or endometriosis.  Surgical consultation  is recommended.  2. Note that the patient reports a history of a right oophorectomy, however there is a structure in the right adnexa that resembles a normal-appearing ovary.  Correlate clinically with the patient's operative report.  3. Status post hysterectomy.           Dictated by (CST): Kye Yanez MD on 1/05/2024 at 12:55 PM      Finalized by (CST): Kye Yanez MD on 1/05/2024 at 1:01 PM

## 2024-01-08 ENCOUNTER — TELEPHONE (OUTPATIENT)
Dept: FAMILY MEDICINE CLINIC | Facility: CLINIC | Age: 49
End: 2024-01-08

## 2024-01-08 ENCOUNTER — PATIENT MESSAGE (OUTPATIENT)
Dept: FAMILY MEDICINE CLINIC | Facility: CLINIC | Age: 49
End: 2024-01-08

## 2024-01-08 ENCOUNTER — TELEPHONE (OUTPATIENT)
Dept: OBGYN CLINIC | Facility: CLINIC | Age: 49
End: 2024-01-08

## 2024-01-08 DIAGNOSIS — N83.8 OVARIAN MASS, LEFT: Primary | ICD-10-CM

## 2024-01-08 NOTE — TELEPHONE ENCOUNTER
Pt called and informed will fax over MRI and US report along with EMB notes. Pt state understanding. Information printed and faxed.

## 2024-01-08 NOTE — TELEPHONE ENCOUNTER
Patient is requesting a referral to see Dr. Edi Covarrubias [phone] per her OB NP Betty Barros. Patient also stated the gynecologist oncologist is requesting all scans and diagnosis pertaining to her recent diagnosis.

## 2024-01-08 NOTE — TELEPHONE ENCOUNTER
Called patient back. She had more clarifying questions. Needs referral from PCP. Rev'd I may dr. Winslow aware of her case and to call office for referral.  She will schedule with dr. Carter for consult.

## 2024-01-09 NOTE — TELEPHONE ENCOUNTER
Pt advised we will need signed FELICIA, then will fax office notes, labs, imaging reports etc to Dr. Alvarado. Pt will come to office this AM to sign FELICIA.       To the office of Dr. Winslow- pt still waiting for referral to Dr. Alvarado from PCP. Referral in [facility] was initially placed by our office, but insurance requires from PCP.  Please call pt when referral has been placed.

## 2024-01-09 NOTE — TELEPHONE ENCOUNTER
From: Rico Hurst  To: Lizz Gilma Wnislow  Sent: 1/8/2024 11:47 AM CST  Subject: referral from OB regarding ovarian cyst    I was given a referral to a gynecologist oncologist Dr. Edi Alvarado by my OB NP Betty Barros - my insurance ([facility] [place]) said it had to come from you as my primary care physician. Not sure of next steps. Do you need to see me? Or can this be done without a visit?

## 2024-01-09 NOTE — TELEPHONE ENCOUNTER
Betty Cardona,    Patient is requesting all scans and diagnosis pertaining to her recent diagnosis be sent to Dr. Edi Alvarado at [phone].     Thank you.

## 2024-01-11 NOTE — TELEPHONE ENCOUNTER
Patient is calling to let us know that the referral needs to be faxed to Dr Spears office at [phone].    Referral was faxed using [facility] Fax.  Confirmation was received.        MD Rico Acevedo2 days ago       Zuhair Gómez - I placed the referral and looks like it was authorized so you can schedule an appointment. - Dr. Winslow

## 2024-01-15 ENCOUNTER — TELEPHONE (OUTPATIENT)
Dept: FAMILY MEDICINE CLINIC | Facility: CLINIC | Age: 49
End: 2024-01-15

## 2024-01-15 NOTE — TELEPHONE ENCOUNTER
Pt is calling and stating that she has a appt with Edi Haro tomorrow and they still have not received the referral from us. Pt is very upset. Pt stated that they  only received the referral order but they need the actual authorization number. Pt was inform she will have to call the referral department so they fax the referral with the actual authorization number.   I will also reach out to the referral department.I reached out to the referral department and was inform it had been taken care off.     Pt was called and she stated that she did speak to Wendie and she inform her that she had just faxed it. No further action is needed.

## 2024-01-18 ENCOUNTER — LAB ENCOUNTER (OUTPATIENT)
Dept: LAB | Facility: HOSPITAL | Age: 49
End: 2024-01-18
Attending: OBSTETRICS & GYNECOLOGY
Payer: COMMERCIAL

## 2024-01-18 DIAGNOSIS — R19.07 GENERALIZED ABDOMINAL OR PELVIC SWELLING OR MASS OR LUMP: Primary | ICD-10-CM

## 2024-01-18 LAB
ALBUMIN SERPL-MCNC: 4.1 G/DL (ref 3.2–4.8)
ALBUMIN/GLOB SERPL: 1.5 {RATIO} (ref 1–2)
ALP LIVER SERPL-CCNC: 62 U/L
ALT SERPL-CCNC: 8 U/L
ANION GAP SERPL CALC-SCNC: 1 MMOL/L (ref 0–18)
APTT PPP: 32.3 SECONDS (ref 23.3–35.6)
AST SERPL-CCNC: 11 U/L (ref ?–34)
BASOPHILS # BLD AUTO: 0.02 X10(3) UL (ref 0–0.2)
BASOPHILS NFR BLD AUTO: 0.4 %
BILIRUB SERPL-MCNC: 0.7 MG/DL (ref 0.3–1.2)
BUN BLD-MCNC: 16 MG/DL (ref 9–23)
BUN/CREAT SERPL: 18.6 (ref 10–20)
CALCIUM BLD-MCNC: 9.5 MG/DL (ref 8.7–10.4)
CANCER AG125 SERPL-ACNC: 5 U/ML (ref ?–30.2)
CANCER AG19-9 SERPL-ACNC: 9.6 U/ML (ref ?–35)
CEA SERPL-MCNC: <0.5 NG/ML (ref ?–5)
CHLORIDE SERPL-SCNC: 108 MMOL/L (ref 98–112)
CO2 SERPL-SCNC: 32 MMOL/L (ref 21–32)
CREAT BLD-MCNC: 0.86 MG/DL
DEPRECATED RDW RBC AUTO: 39.3 FL (ref 35.1–46.3)
EGFRCR SERPLBLD CKD-EPI 2021: 84 ML/MIN/1.73M2 (ref 60–?)
EOSINOPHIL # BLD AUTO: 0.1 X10(3) UL (ref 0–0.7)
EOSINOPHIL NFR BLD AUTO: 1.9 %
ERYTHROCYTE [DISTWIDTH] IN BLOOD BY AUTOMATED COUNT: 12.5 % (ref 11–15)
FASTING STATUS PATIENT QL REPORTED: YES
GLOBULIN PLAS-MCNC: 2.7 G/DL (ref 2.8–4.4)
GLUCOSE BLD-MCNC: 79 MG/DL (ref 70–99)
HCT VFR BLD AUTO: 37.3 %
HGB BLD-MCNC: 12.8 G/DL
IMM GRANULOCYTES # BLD AUTO: 0.03 X10(3) UL (ref 0–1)
IMM GRANULOCYTES NFR BLD: 0.6 %
INR BLD: 0.99 (ref 0.8–1.2)
LYMPHOCYTES # BLD AUTO: 1.32 X10(3) UL (ref 1–4)
LYMPHOCYTES NFR BLD AUTO: 25 %
MCH RBC QN AUTO: 29.6 PG (ref 26–34)
MCHC RBC AUTO-ENTMCNC: 34.3 G/DL (ref 31–37)
MCV RBC AUTO: 86.3 FL
MONOCYTES # BLD AUTO: 0.42 X10(3) UL (ref 0.1–1)
MONOCYTES NFR BLD AUTO: 8 %
NEUTROPHILS # BLD AUTO: 3.38 X10 (3) UL (ref 1.5–7.7)
NEUTROPHILS # BLD AUTO: 3.38 X10(3) UL (ref 1.5–7.7)
NEUTROPHILS NFR BLD AUTO: 64.1 %
OSMOLALITY SERPL CALC.SUM OF ELEC: 292 MOSM/KG (ref 275–295)
PLATELET # BLD AUTO: 179 10(3)UL (ref 150–450)
POTASSIUM SERPL-SCNC: 4.3 MMOL/L (ref 3.5–5.1)
PROT SERPL-MCNC: 6.8 G/DL (ref 5.7–8.2)
PROTHROMBIN TIME: 13.7 SECONDS (ref 11.6–14.8)
RBC # BLD AUTO: 4.32 X10(6)UL
SODIUM SERPL-SCNC: 141 MMOL/L (ref 136–145)
WBC # BLD AUTO: 5.3 X10(3) UL (ref 4–11)

## 2024-01-18 PROCEDURE — 86304 IMMUNOASSAY TUMOR CA 125: CPT

## 2024-01-18 PROCEDURE — 85730 THROMBOPLASTIN TIME PARTIAL: CPT

## 2024-01-18 PROCEDURE — 82378 CARCINOEMBRYONIC ANTIGEN: CPT

## 2024-01-18 PROCEDURE — 86301 IMMUNOASSAY TUMOR CA 19-9: CPT

## 2024-01-18 PROCEDURE — 85025 COMPLETE CBC W/AUTO DIFF WBC: CPT

## 2024-01-18 PROCEDURE — 80053 COMPREHEN METABOLIC PANEL: CPT

## 2024-01-18 PROCEDURE — 36415 COLL VENOUS BLD VENIPUNCTURE: CPT

## 2024-01-18 PROCEDURE — 85610 PROTHROMBIN TIME: CPT

## 2024-01-22 ENCOUNTER — HOSPITAL ENCOUNTER (OUTPATIENT)
Dept: MRI IMAGING | Age: 49
Discharge: HOME | End: 2024-01-22
Attending: OBSTETRICS & GYNECOLOGY
Payer: COMMERCIAL

## 2024-01-22 DIAGNOSIS — N94.89 ADNEXAL MASS: ICD-10-CM

## 2024-01-22 PROCEDURE — 72197 MRI PELVIS W/O & W/DYE: CPT | Performed by: OBSTETRICS & GYNECOLOGY

## 2024-01-22 PROCEDURE — A9575 INJ GADOTERATE MEGLUMI 0.1ML: HCPCS | Performed by: OBSTETRICS & GYNECOLOGY

## 2024-01-22 RX ORDER — DIPHENHYDRAMINE HYDROCHLORIDE 50 MG/ML
10 INJECTION, SOLUTION INTRAMUSCULAR; INTRAVENOUS
Status: COMPLETED | OUTPATIENT
Start: 2024-01-22 | End: 2024-01-22

## 2024-01-22 RX ADMIN — DIPHENHYDRAMINE HYDROCHLORIDE 10 ML: 50 INJECTION, SOLUTION INTRAMUSCULAR; INTRAVENOUS at 09:43:00

## 2024-02-06 ENCOUNTER — LAB ENCOUNTER (OUTPATIENT)
Dept: LAB | Facility: HOSPITAL | Age: 49
End: 2024-02-06
Attending: OBSTETRICS & GYNECOLOGY
Payer: COMMERCIAL

## 2024-02-06 DIAGNOSIS — Z01.818 PREOP TESTING: ICD-10-CM

## 2024-02-06 LAB
ANTIBODY SCREEN: POSITIVE
DIRECT COOMBS POLY: NEGATIVE
E ANTIGEN: NEGATIVE
RH BLOOD TYPE: POSITIVE
RH BLOOD TYPE: POSITIVE

## 2024-02-06 PROCEDURE — 86870 RBC ANTIBODY IDENTIFICATION: CPT

## 2024-02-06 PROCEDURE — 86850 RBC ANTIBODY SCREEN: CPT

## 2024-02-06 PROCEDURE — 36415 COLL VENOUS BLD VENIPUNCTURE: CPT

## 2024-02-06 PROCEDURE — 86905 BLOOD TYPING RBC ANTIGENS: CPT

## 2024-02-06 PROCEDURE — 86077 PHYS BLOOD BANK SERV XMATCH: CPT

## 2024-02-06 PROCEDURE — 86922 COMPATIBILITY TEST ANTIGLOB: CPT

## 2024-02-06 PROCEDURE — 86880 COOMBS TEST DIRECT: CPT

## 2024-02-06 PROCEDURE — 86900 BLOOD TYPING SEROLOGIC ABO: CPT

## 2024-02-06 PROCEDURE — 86901 BLOOD TYPING SEROLOGIC RH(D): CPT

## 2024-02-08 ENCOUNTER — ANESTHESIA EVENT (OUTPATIENT)
Dept: SURGERY | Facility: HOSPITAL | Age: 49
End: 2024-02-08
Payer: COMMERCIAL

## 2024-02-08 LAB — RGTSCRN: 4

## 2024-02-08 NOTE — DISCHARGE INSTRUCTIONS
DISCHARGE INSTRUCTIONS  Robotic Hysterectomy  and Removal of left Ovary    Please call the office ([phone]) within 48 hours of returning home to schedule or confirm a postoperative visit with your physician.    PAIN:  It is common for women to experience mild to moderate pain after they are discharged from the hospital. We expect that this pain should lessen with each day after surgery. You will receive a prescription for pain medicine at the time of your discharge. It is important to use pain medications as you need them, in order for you to be comfortable, to promote healing and to increase your daily activities.    Most commonly prescribed medications include:  Norco: You may take this medication every 4-6 hours as needed for pain. DO NOT combine this medication with Tylenol.  Tramadol: You may take this medication every 4-6 hours as needed for pain. It is typically given to patients who do not tolerate Norco or who have allergies to Norco.  Tylenol Regular Strength: 325mg every 6 hours as needed for pain. As you begin to feel better you can stop taking your prescription pain medication and take the Tylenol alone.  DO NOT take medications such as Motrin, aspirin or ibuprofen if you are taking other blood thinning medications (such as: Lovenox, Coumadin or Plavix).  Motrin: If you are not taking a prescribed blood thinning medication, you may take Motrin 400mg- 600mg every 6 hours as needed for pain.  You should not drive while taking narcotic pain medications.  Taking a warm shower or bath often helps to relieve pain as well.  As you begin to feel better you can stop taking your prescribed narcotic pain medication and take Tylenol alone.      Refilling your prescription medications:    Please keep in mind that prescription medications cannot be refilled after office hours or on the weekends. Please give the office 24-48 hours’ notice if you need a refill. Certain pain medications cannot be called into your  pharmacy, these prescriptions will need to be picked up at the office.    Call your doctor if: You are experiencing worsening pain or pain that is not relieved by  Medications.    BLOOD CLOTS:  You may be sent home on injectable medication blood thinners (Fragmin, Lovenox or Arixtra) to prevent blood clots from forming in your legs or pelvis after surgery.  You will be instructed on how to inject this medication into your thigh on a daily basis.  Never inject this medication anywhere near your abdominal incision.  Blood clots can move to different places in your body, such as your heart or lungs.  DO NOT take medications such as Motrin, Coumadin, aspirin or ibuprofen while taking your injectable blood thinning medications.  It is possible for patients that are considered “high risk” for developing blood clots to go home with up to a 1-2 week supply of injectable blood thinning medication.  Most importantly be active. Walk around during the daytime every 2 hours. This will help prevent blood clots from forming.      Call your doctor if: You are experiencing pain and unequal swelling in your claves, shortness of breath or chest pain.    NAUSEA:  Patients often experience nausea after surgery. This is often related to anesthesia and slow return of bowel function.    Commonly prescribed medications for nausea include:  Compazine: Take every 6 hours as needed for nausea.  Zofran: Take every 6 hours as needed for nausea.  Small meals frequent meals, slow sips of fluids and saltine crackers can also help to relieve nausea.    Call your doctor if: You are experiencing nausea and vomiting that is uncontrolled by medication.    BOWEL AND BLADDER FUNCTION:  Abdominal surgery can cause changes in bowel patterns. Pain medication can also cause constipation, although this is not a reason to stop taking your prescribed pain medication. It will be helpful to follow the tips below to avoid constipation after surgery.  Colace stool  softener: It is important to take a stool softener while on narcotic pain medication. You will be given a prescription for this at the time of your discharge. It is also available at your pharmacy without a prescription. Colace will NOT help you move your bowel. It will only help to keep your stool soft. It is important to take a stool softener as long as you are on pain medications. You may stop this medication if you are having loose stools.  For the first few days after surgery avoid high fiber foods as they are hard to digest. Once you are passing gas regularly you may add high fiber foods to your diet. Fresh fruits and bran cereals can help avoid constipation.  Prune juice and apricot nectar are also helpful to avoid constipation.  Drink plenty of fluids (at least 6-8 glasses of water or other non-caffeinated fluids daily).  Staying active will also promote good bowel activity. Walk around the house or take short walks outside.    If you are still unable to move your bowels you can use one of the following:  Dulcolax or Senna: are available without a prescription and can help stimulate your bowels.  Miralax: Can also be used daily until your bowel patterns have returned to normal.    Some women experience an increase in bowel motility after surgery- diarrhea.  Stop Colace stool softener.  Metamucil/ Citrucel: 1-2 teaspoons can be taken daily to slow down diarrhea.  If you have used Metamucil/ Citrucel for 2 days and diarrhea persists- please call the office.    Some women will get a urinary tract infection after surgery: The best way to avoid this is to drink plenty of fluids. Symptoms of a urinary tract infection include:  Pain with urination  Fever  Blood in your urine  Call your doctor if you are experiencing any of these symptoms.    Call the doctor if:  You have diarrhea that is not relieved by Metamucil/ Citrucel.  You have constipation that persists beyond 3 days after discharge from the hospital.  You  experience any rectal bleeding or blood in your urine.    ACTIVITY:  May not resume driving until: you have completed your post-operative doctor’s appointment, you can walk with ease and are no longer taking narcotic pain medication.  Gradually resume your daily activities. Take rest periods throughout the day.  Avoid sitting for long periods of time.  Avoid strenuous activities, heavy housework (vacuuming) and heavy lifting (greater that 5-10 pounds). A good rule of judgement is: if it weighs more than a gallon of milk or requires more than one hand to  an object, DO NOT LIFT IT for at least 4 weeks.  Climbing stairs is OK- just take them one at a time until you regain your strength.  It is important to stay active to reduce the risk of developing blood clots in your legs and lungs. We suggest that you take short walks (in the house or outside) at least every 1-2 hours during your waking hours. If you notice spotting or vaginal bleeding after activity, rest until it resolves.    Call the doctor if you experience any of the following symptoms:  Persistent fatigue prohibiting you from your daily activities  Shortness of breath  Chest pain  Swelling in one leg/foot more than the other  Calf pain    BLEEDING AND VAGINAL DISCHARGE:  DO NOT PLACE ANYTHING IN THE VAGINA. This includes intercourse, douching, tampons, etc.  Your doctor will let you know when you can resume normal vaginal activity.  Expect slight spotting for up to 2 weeks after surgery. This may be pink, red or brownish. There should be no offensive odor. You may wear a pad or panty liner until discharge resolves.    Call the doctor for:  Any heavy bleeding or bright red blood from the vagina. Soaking through more than one pad in one hour or if you are using more than 3 mini pads per day.  Temperature above 101.0° F (38.3° C) on two occasions 4 hours apart.  Any foul smelling discharge.    WOUND CARE:  You may shower daily. Avoid baths for the first  2 weeks after surgery.  You do have stitches (sutures) at the top of your vagina. These sutures will dissolve and will fall out as they do. Do not be alarmed if you see small pieces of black or blue string.  You also have small sutures called Dermabond on the 3-5 laparoscopic sites on your belly. The sutures will dissolve on their own over the next few weeks. The sites do not require any type of bandage over it.    Call the doctor for:  Temperature above 101.0° F (38.3° C) on two occasions 4 hours apart.  You have any foul smelling (fishy) discharge.  Redness, swelling or warmth around incision sites that is increasing.  Any drainage from the incision that soaks a gauze pad.  You have pain which is not relieved by pain medication.  You have not moved your bowels for three days.  You have burning or pain with urination.    FOLLOW UP:  We plan to see you in the office within 7-10 days of your surgery asses your post-operative recovery and to discuss the pathology results from your surgery.  If you are not given an office appointment at the time of your discharge from the hospital, please call the office to schedule a post-operative appointment with your doctor at [phone].    CALLING THE DOCTOR  During office hours (M-F; 8am- 4:30pm) call: [phone] and ask to speak with the Nurse or Physician Assistant.  After office hours: call [phone] to reach the on-call physician. Please reserve evening and weekend phone calls for urgent matters only.  If you are receiving home healthcare, call your home healthcare nurse, who will assess your condition and call the doctor if necessary.  For emergencies call: [phone] or go immediately to the nearest Emergency Room.       HOME INSTRUCTIONS  AMBSURG HOME CARE INSTRUCTIONS: POST-OP ANESTHESIA  The medication that you received for sedation or general anesthesia can last up to 24 hours. Your judgment and reflexes may be altered, even if you feel like your normal self.      We  Recommend:   Do not drive any motor vehicle or bicycle   Avoid mowing the lawn, playing sports, or working with power tools/applicances (power saws, electric knives or mixers)   That you have someone stay with you on your first night home   Do not drink alcohol or take sleeping pills or tranquilizers   Do not sign legal documents within 24 hours of your procedure   If you had a nerve block for your surgery, take extra care not to put any pressure on your arm or hand for 24 hours    It is normal:  For you to have a sore throat if you had a breathing tube during surgery (while you were asleep!). The sore throat should get better within 48 hours. You can gargle with warm salt water (1/2 tsp in 4 oz warm water) or use a throat lozenge for comfort  To feel muscle aches or soreness especially in the abdomen, chest or neck. The achy feeling should go away in the next 24 hours  To feel weak, sleepy or \"wiped out\". Your should start feeling better in the next 24 hours.   To experience mild discomforts such as sore lip or tongue, headache, cramps, gas pains or a bloated feeling in your abdomen.   To experience mild back pain or soreness for a day or two if you had spinal or epidural anesthesia.   If you had laparoscopic surgery, to feel shoulder pain or discomfort on the day of surgery.   For some patients to have nausea after surgery/anesthesia    If you feel nausea or experience vomiting:   Try to move around less.   Eat less than usual or drink only liquids until the next morning   Nausea should resolve in about 24 hours    If you have a problem when you are at home:    Call your surgeons office   Discharge Instructions: After Your Surgery  You’ve just had surgery. During surgery, you were given medicine called anesthesia to keep you relaxed and free of pain. After surgery, you may have some pain or nausea. This is common. Here are some tips for feeling better and getting well after surgery.   Going home  Your healthcare  provider will show you how to take care of yourself when you go home. They'll also answer your questions. Have an adult family member or friend drive you home. For the first 24 hours after your surgery:   Don't drive or use heavy equipment.  Don't make important decisions or sign legal papers.  Take medicines as directed.  Don't drink alcohol.  Have someone stay with you, if needed. They can watch for problems and help keep you safe.  Be sure to go to all follow-up visits with your healthcare provider. And rest after your surgery for as long as your provider tells you to.   Coping with pain  If you have pain after surgery, pain medicine will help you feel better. Take it as directed, before pain becomes severe. Also, ask your healthcare provider or pharmacist about other ways to control pain. This might be with heat, ice, or relaxation. And follow any other instructions your surgeon or nurse gives you.      Stay on schedule with your medicine.     Tips for taking pain medicine  To get the best relief possible, remember these points:   Pain medicines can upset your stomach. Taking them with a little food may help.  Most pain relievers taken by mouth need at least 20 to 30 minutes to start to work.  Don't wait till your pain becomes severe before you take your medicine. Try to time your medicine so that you can take it before starting an activity. This might be before you get dressed, go for a walk, or sit down for dinner.  Constipation is a common side effect of some pain medicines. Call your healthcare provider before taking any medicines such as laxatives or stool softeners to help ease constipation. Also ask if you should skip any foods. Drinking lots of fluids and eating foods such as fruits and vegetables that are high in fiber can also help. Remember, don't take laxatives unless your surgeon has prescribed them.  Drinking alcohol and taking pain medicine can cause dizziness and slow your breathing. It can even  be deadly. Don't drink alcohol while taking pain medicine.  Pain medicine can make you react more slowly to things. Don't drive or run machinery while taking pain medicine.  Your healthcare provider may tell you to take acetaminophen to help ease your pain. Ask them how much you're supposed to take each day. Acetaminophen or other pain relievers may interact with your prescription medicines or other over-the-counter (OTC) medicines. Some prescription medicines have acetaminophen and other ingredients in them. Using both prescription and OTC acetaminophen for pain can cause you to accidentally overdose. Read the labels on your OTC medicines with care. This will help you to clearly know the list of ingredients, how much to take, and any warnings. It may also help you not take too much acetaminophen. If you have questions or don't understand the information, ask your pharmacist or healthcare provider to explain it to you before you take the OTC medicine.   Managing nausea  Some people have an upset stomach (nausea) after surgery. This is often because of anesthesia, pain, or pain medicine, less movement of food in the stomach, or the stress of surgery. These tips will help you handle nausea and eat healthy foods as you get better. If you were on a special food plan before surgery, ask your healthcare provider if you should follow it while you get better. Check with your provider on how your eating should progress. It may depend on the surgery you had. These general tips may help:   Don't push yourself to eat. Your body will tell you when to eat and how much.  Start off with clear liquids and soup. They're easier to digest.  Next try semi-solid foods as you feel ready. These include mashed potatoes, applesauce, and gelatin.  Slowly move to solid foods. Don’t eat fatty, rich, or spicy foods at first.  Don't force yourself to have 3 large meals a day. Instead eat smaller amounts more often.  Take pain medicines with a  small amount of solid food, such as crackers or toast. This helps prevent nausea.  When to call your healthcare provider  Call your healthcare provider right away if you have any of these:   You still have too much pain, or the pain gets worse, after taking the medicine. The medicine may not be strong enough. Or there may be a complication from the surgery.  You feel too sleepy, dizzy, or groggy. The medicine may be too strong.  Side effects such as nausea or vomiting. Your healthcare provider may advise taking other medicines to .  Skin changes such as rash, itching, or hives. This may mean you have an allergic reaction. Your provider may advise taking other medicines.  The incision looks different (for instance, part of it opens up).  Bleeding or fluid leaking from the incision site, and weren't told to expect that.  Fever of 100.4°F (38°C) or higher, or as directed by your provider.  Call 911  Call 911 right away if you have:   Trouble breathing  Facial swelling    If you have obstructive sleep apnea   You were given anesthesia medicine during surgery to keep you comfortable and free of pain. After surgery, you may have more apnea spells because of this medicine and other medicines you were given. The spells may last longer than normal.    At home:  Keep using the continuous positive airway pressure (CPAP) device when you sleep. Unless your healthcare provider tells you not to, use it when you sleep, day or night. CPAP is a common device used to treat obstructive sleep apnea.  Talk with your provider before taking any pain medicine, muscle relaxants, or sedatives. Your provider will tell you about the possible dangers of taking these medicines.  Contact your provider if your sleeping changes a lot even when taking medicines as directed.  [employer] last reviewed this educational content on 10/1/2021  © 3131-7994 [employer]. All rights reserved. This information is not intended as a substitute for  professional medical care. Always follow your healthcare professional's instructions.

## 2024-02-09 ENCOUNTER — ANESTHESIA (OUTPATIENT)
Dept: SURGERY | Facility: HOSPITAL | Age: 49
End: 2024-02-09
Payer: COMMERCIAL

## 2024-02-09 ENCOUNTER — HOSPITAL ENCOUNTER (OUTPATIENT)
Facility: HOSPITAL | Age: 49
Setting detail: HOSPITAL OUTPATIENT SURGERY
Discharge: HOME | End: 2024-02-09
Attending: OBSTETRICS & GYNECOLOGY | Admitting: OBSTETRICS & GYNECOLOGY
Payer: COMMERCIAL

## 2024-02-09 VITALS
BODY MASS INDEX: 24.73 KG/M2 | WEIGHT: 131 LBS | RESPIRATION RATE: 12 BRPM | TEMPERATURE: 99 F | DIASTOLIC BLOOD PRESSURE: 85 MMHG | SYSTOLIC BLOOD PRESSURE: 125 MMHG | HEIGHT: 61 IN | OXYGEN SATURATION: 100 % | HEART RATE: 78 BPM

## 2024-02-09 DIAGNOSIS — Z01.818 PREOP TESTING: Primary | ICD-10-CM

## 2024-02-09 DIAGNOSIS — Z90.711 HISTORY OF HYSTERECTOMY, SUPRACERVICAL: ICD-10-CM

## 2024-02-09 PROCEDURE — 0TN74ZZ RELEASE LEFT URETER, PERCUTANEOUS ENDOSCOPIC APPROACH: ICD-10-PCS | Performed by: OBSTETRICS & GYNECOLOGY

## 2024-02-09 PROCEDURE — 88331 PATH CONSLTJ SURG 1 BLK 1SPC: CPT | Performed by: OBSTETRICS & GYNECOLOGY

## 2024-02-09 PROCEDURE — 3E0W3KZ INTRODUCTION OF OTHER DIAGNOSTIC SUBSTANCE INTO LYMPHATICS, PERCUTANEOUS APPROACH: ICD-10-PCS | Performed by: OBSTETRICS & GYNECOLOGY

## 2024-02-09 PROCEDURE — 88112 CYTOPATH CELL ENHANCE TECH: CPT | Performed by: OBSTETRICS & GYNECOLOGY

## 2024-02-09 PROCEDURE — 0UTC4ZZ RESECTION OF CERVIX, PERCUTANEOUS ENDOSCOPIC APPROACH: ICD-10-PCS | Performed by: OBSTETRICS & GYNECOLOGY

## 2024-02-09 PROCEDURE — 88307 TISSUE EXAM BY PATHOLOGIST: CPT | Performed by: OBSTETRICS & GYNECOLOGY

## 2024-02-09 PROCEDURE — 0UT14ZZ RESECTION OF LEFT OVARY, PERCUTANEOUS ENDOSCOPIC APPROACH: ICD-10-PCS | Performed by: OBSTETRICS & GYNECOLOGY

## 2024-02-09 PROCEDURE — 07BC4ZZ EXCISION OF PELVIS LYMPHATIC, PERCUTANEOUS ENDOSCOPIC APPROACH: ICD-10-PCS | Performed by: OBSTETRICS & GYNECOLOGY

## 2024-02-09 PROCEDURE — 88305 TISSUE EXAM BY PATHOLOGIST: CPT | Performed by: OBSTETRICS & GYNECOLOGY

## 2024-02-09 PROCEDURE — 8E0W4CZ ROBOTIC ASSISTED PROCEDURE OF TRUNK REGION, PERCUTANEOUS ENDOSCOPIC APPROACH: ICD-10-PCS | Performed by: OBSTETRICS & GYNECOLOGY

## 2024-02-09 PROCEDURE — 0DNU4ZZ RELEASE OMENTUM, PERCUTANEOUS ENDOSCOPIC APPROACH: ICD-10-PCS | Performed by: OBSTETRICS & GYNECOLOGY

## 2024-02-09 RX ORDER — NALOXONE HYDROCHLORIDE 0.4 MG/ML
80 INJECTION, SOLUTION INTRAMUSCULAR; INTRAVENOUS; SUBCUTANEOUS AS NEEDED
Status: DISCONTINUED | OUTPATIENT
Start: 2024-02-09 | End: 2024-02-09

## 2024-02-09 RX ORDER — SODIUM CHLORIDE 9 MG/ML
INJECTION, SOLUTION INTRAVENOUS CONTINUOUS PRN
Status: DISCONTINUED | OUTPATIENT
Start: 2024-02-09 | End: 2024-02-09 | Stop reason: SURG

## 2024-02-09 RX ORDER — SODIUM CHLORIDE, SODIUM LACTATE, POTASSIUM CHLORIDE, CALCIUM CHLORIDE 600; 310; 30; 20 MG/100ML; MG/100ML; MG/100ML; MG/100ML
INJECTION, SOLUTION INTRAVENOUS CONTINUOUS
Status: DISCONTINUED | OUTPATIENT
Start: 2024-02-09 | End: 2024-02-09

## 2024-02-09 RX ORDER — MIDAZOLAM HYDROCHLORIDE 1 MG/ML
INJECTION INTRAMUSCULAR; INTRAVENOUS AS NEEDED
Status: DISCONTINUED | OUTPATIENT
Start: 2024-02-09 | End: 2024-02-09 | Stop reason: SURG

## 2024-02-09 RX ORDER — HYDROMORPHONE HYDROCHLORIDE 1 MG/ML
0.4 INJECTION, SOLUTION INTRAMUSCULAR; INTRAVENOUS; SUBCUTANEOUS EVERY 5 MIN PRN
Status: DISCONTINUED | OUTPATIENT
Start: 2024-02-09 | End: 2024-02-09

## 2024-02-09 RX ORDER — BUPIVACAINE HYDROCHLORIDE 5 MG/ML
INJECTION, SOLUTION EPIDURAL; INTRACAUDAL AS NEEDED
Status: DISCONTINUED | OUTPATIENT
Start: 2024-02-09 | End: 2024-02-09 | Stop reason: HOSPADM

## 2024-02-09 RX ORDER — HYDROMORPHONE HYDROCHLORIDE 1 MG/ML
0.2 INJECTION, SOLUTION INTRAMUSCULAR; INTRAVENOUS; SUBCUTANEOUS EVERY 5 MIN PRN
Status: DISCONTINUED | OUTPATIENT
Start: 2024-02-09 | End: 2024-02-09

## 2024-02-09 RX ORDER — IBUPROFEN 600 MG/1
600 TABLET ORAL EVERY 8 HOURS PRN
Qty: 60 TABLET | Refills: 0 | Status: SHIPPED | OUTPATIENT
Start: 2024-02-09

## 2024-02-09 RX ORDER — MORPHINE SULFATE 4 MG/ML
4 INJECTION, SOLUTION INTRAMUSCULAR; INTRAVENOUS EVERY 10 MIN PRN
Status: DISCONTINUED | OUTPATIENT
Start: 2024-02-09 | End: 2024-02-09

## 2024-02-09 RX ORDER — MORPHINE SULFATE 4 MG/ML
2 INJECTION, SOLUTION INTRAMUSCULAR; INTRAVENOUS EVERY 10 MIN PRN
Status: DISCONTINUED | OUTPATIENT
Start: 2024-02-09 | End: 2024-02-09

## 2024-02-09 RX ORDER — ONDANSETRON 2 MG/ML
INJECTION INTRAMUSCULAR; INTRAVENOUS AS NEEDED
Status: DISCONTINUED | OUTPATIENT
Start: 2024-02-09 | End: 2024-02-09 | Stop reason: SURG

## 2024-02-09 RX ORDER — HYDROCODONE BITARTRATE AND ACETAMINOPHEN 5; 325 MG/1; MG/1
2 TABLET ORAL EVERY 6 HOURS PRN
Status: DISCONTINUED | OUTPATIENT
Start: 2024-02-09 | End: 2024-02-09

## 2024-02-09 RX ORDER — KETOROLAC TROMETHAMINE 30 MG/ML
INJECTION, SOLUTION INTRAMUSCULAR; INTRAVENOUS AS NEEDED
Status: DISCONTINUED | OUTPATIENT
Start: 2024-02-09 | End: 2024-02-09 | Stop reason: SURG

## 2024-02-09 RX ORDER — ONDANSETRON 4 MG/1
4 TABLET, FILM COATED ORAL EVERY 8 HOURS PRN
Status: DISCONTINUED | OUTPATIENT
Start: 2024-02-09 | End: 2024-02-09

## 2024-02-09 RX ORDER — HYDROCODONE BITARTRATE AND ACETAMINOPHEN 5; 325 MG/1; MG/1
1 TABLET ORAL EVERY 6 HOURS PRN
Qty: 20 TABLET | Refills: 0 | Status: SHIPPED | OUTPATIENT
Start: 2024-02-09

## 2024-02-09 RX ORDER — ROCURONIUM BROMIDE 10 MG/ML
INJECTION, SOLUTION INTRAVENOUS AS NEEDED
Status: DISCONTINUED | OUTPATIENT
Start: 2024-02-09 | End: 2024-02-09 | Stop reason: SURG

## 2024-02-09 RX ORDER — HYDROMORPHONE HYDROCHLORIDE 1 MG/ML
0.6 INJECTION, SOLUTION INTRAMUSCULAR; INTRAVENOUS; SUBCUTANEOUS EVERY 5 MIN PRN
Status: DISCONTINUED | OUTPATIENT
Start: 2024-02-09 | End: 2024-02-09

## 2024-02-09 RX ORDER — ACETAMINOPHEN 500 MG
1000 TABLET ORAL ONCE
Status: COMPLETED | OUTPATIENT
Start: 2024-02-09 | End: 2024-02-09

## 2024-02-09 RX ORDER — MORPHINE SULFATE 10 MG/ML
6 INJECTION, SOLUTION INTRAMUSCULAR; INTRAVENOUS EVERY 10 MIN PRN
Status: DISCONTINUED | OUTPATIENT
Start: 2024-02-09 | End: 2024-02-09

## 2024-02-09 RX ORDER — PROCHLORPERAZINE EDISYLATE 5 MG/ML
5 INJECTION INTRAMUSCULAR; INTRAVENOUS EVERY 8 HOURS PRN
Status: DISCONTINUED | OUTPATIENT
Start: 2024-02-09 | End: 2024-02-09

## 2024-02-09 RX ORDER — ONDANSETRON 2 MG/ML
4 INJECTION INTRAMUSCULAR; INTRAVENOUS EVERY 6 HOURS PRN
Status: DISCONTINUED | OUTPATIENT
Start: 2024-02-09 | End: 2024-02-09

## 2024-02-09 RX ORDER — CEFAZOLIN SODIUM/WATER 2 G/20 ML
2 SYRINGE (ML) INTRAVENOUS ONCE
Status: COMPLETED | OUTPATIENT
Start: 2024-02-09 | End: 2024-02-09

## 2024-02-09 RX ORDER — IBUPROFEN 600 MG/1
600 TABLET ORAL EVERY 6 HOURS PRN
Status: DISCONTINUED | OUTPATIENT
Start: 2024-02-09 | End: 2024-02-09

## 2024-02-09 RX ORDER — DEXAMETHASONE SODIUM PHOSPHATE 4 MG/ML
VIAL (ML) INJECTION AS NEEDED
Status: DISCONTINUED | OUTPATIENT
Start: 2024-02-09 | End: 2024-02-09 | Stop reason: SURG

## 2024-02-09 RX ORDER — HYDROCODONE BITARTRATE AND ACETAMINOPHEN 5; 325 MG/1; MG/1
1 TABLET ORAL EVERY 6 HOURS PRN
Status: DISCONTINUED | OUTPATIENT
Start: 2024-02-09 | End: 2024-02-09

## 2024-02-09 RX ORDER — ACETAMINOPHEN 325 MG/1
650 TABLET ORAL EVERY 6 HOURS PRN
Status: DISCONTINUED | OUTPATIENT
Start: 2024-02-09 | End: 2024-02-09

## 2024-02-09 RX ORDER — LIDOCAINE HYDROCHLORIDE 10 MG/ML
INJECTION, SOLUTION EPIDURAL; INFILTRATION; INTRACAUDAL; PERINEURAL AS NEEDED
Status: DISCONTINUED | OUTPATIENT
Start: 2024-02-09 | End: 2024-02-09 | Stop reason: SURG

## 2024-02-09 RX ORDER — ONDANSETRON 2 MG/ML
4 INJECTION INTRAMUSCULAR; INTRAVENOUS EVERY 8 HOURS PRN
Status: DISCONTINUED | OUTPATIENT
Start: 2024-02-09 | End: 2024-02-09

## 2024-02-09 RX ORDER — DOCUSATE SODIUM 100 MG/1
100 CAPSULE, LIQUID FILLED ORAL 2 TIMES DAILY PRN
Qty: 60 CAPSULE | Refills: 0 | Status: SHIPPED | OUTPATIENT
Start: 2024-02-09

## 2024-02-09 RX ADMIN — SODIUM CHLORIDE, SODIUM LACTATE, POTASSIUM CHLORIDE, CALCIUM CHLORIDE: 600; 310; 30; 20 INJECTION, SOLUTION INTRAVENOUS at 07:41:00

## 2024-02-09 RX ADMIN — CEFAZOLIN SODIUM/WATER: 2 G/20 ML SYRINGE (ML) INTRAVENOUS at 10:24:00

## 2024-02-09 RX ADMIN — KETOROLAC TROMETHAMINE 15 MG: 30 INJECTION, SOLUTION INTRAMUSCULAR; INTRAVENOUS at 10:00:00

## 2024-02-09 RX ADMIN — SODIUM CHLORIDE, SODIUM LACTATE, POTASSIUM CHLORIDE, CALCIUM CHLORIDE: 600; 310; 30; 20 INJECTION, SOLUTION INTRAVENOUS at 10:24:00

## 2024-02-09 RX ADMIN — DEXAMETHASONE SODIUM PHOSPHATE 4 MG: 4 MG/ML VIAL (ML) INJECTION at 07:48:00

## 2024-02-09 RX ADMIN — SODIUM CHLORIDE: 9 INJECTION, SOLUTION INTRAVENOUS at 07:55:00

## 2024-02-09 RX ADMIN — CEFAZOLIN SODIUM/WATER 2 G: 2 G/20 ML SYRINGE (ML) INTRAVENOUS at 08:00:00

## 2024-02-09 RX ADMIN — MIDAZOLAM HYDROCHLORIDE 2 MG: 1 INJECTION INTRAMUSCULAR; INTRAVENOUS at 07:39:00

## 2024-02-09 RX ADMIN — ONDANSETRON 4 MG: 2 INJECTION INTRAMUSCULAR; INTRAVENOUS at 07:48:00

## 2024-02-09 RX ADMIN — LIDOCAINE HYDROCHLORIDE 50 MG: 10 INJECTION, SOLUTION EPIDURAL; INFILTRATION; INTRACAUDAL; PERINEURAL at 07:46:00

## 2024-02-09 RX ADMIN — ROCURONIUM BROMIDE 20 MG: 10 INJECTION, SOLUTION INTRAVENOUS at 09:12:00

## 2024-02-09 RX ADMIN — ROCURONIUM BROMIDE 50 MG: 10 INJECTION, SOLUTION INTRAVENOUS at 07:48:00

## 2024-02-09 NOTE — ANESTHESIA PROCEDURE NOTES
Peripheral IV  Date/Time: 2/9/2024 7:53 AM  Inserted by: Sierra Medina CRNA    Placement  Needle size: 18 G  Laterality: right  Location: forearm  Local anesthetic: none  Site prep: alcohol  Technique: anatomical landmarks  Attempts: 1

## 2024-02-09 NOTE — CONSULTS
DIAGNOSIS:  Adnexal mass  CHIEF COMPLAINT:    Complex left adnexal mass    HISTORY OF PRESENT ILLNESS:    Rico Hurst is a very pleasant 48 year old female who presents for consultation secondary to complex left adnexal mass.    Patient underwent CT abdomen/pelvis on 2023 secondary to complaints of left flank pain with dysuria, which noted complex-appearing multiloculated left ovarian cyst measuring 8.1 x 5.8 x 5.0 cm, along with small volume free pelvic fluid. Follow-up US again demonstrated complex left adnexal cystic mass measuring 7.8 cm, possibly a benign or malignant ovarian neoplasm vs. tubo-ovarian abscess vs. endometriosis. Patient was referred to gyn onc for further management. She presents today for her initial visit.    Today she feels well. Denies continued abdominal pain, loss of appetite, bloating, fatigue, diarrhea/constipation, vaginal bleeding or change in vaginal discharge.     PAST CANCER HISTORY:           PAST MEDICAL HISTORY:  Adnexal mass    PAST SURGICAL HISTORY:           Abdominal myomectomy x2  Open appendectomy  Supracervical hysterectomy 2/2 fibroids; complicated by significant adhesions, requiring blood transfusion    PAST OB-GYN HISTORY:             PAST SOCIAL HISTORY:    (Reviewed - no changes required)  Marital:      Smoking Status: Smoking Tobacco : none found; Smokeless Tobacco : none found; Vaping : none found  Alcohol Consumption: None  Substance Abuse: None   Work History:        PAST FAMILY HISTORY:         Denies history of ovarian, endometrial, cervical or breast cancer    CURRENT MEDICATIONS:      Medication List  Name Date  Multivitamins Oral Tablet 2024    ALLERGIES:    No known medication allergies    REVIEW OF SYSTEMS:    A 14 point review of systems was performed with any pertinent positives noted in the HPI and otherwise negative.    VITAL SIGNS:    Blood pressure: 102/60, Pulse: 66, Temperature: 97.8 F, Respirations: , O2 sat: 94%, Pain Scale:  , Height: 60.25 in, Weight: 129.6 lb, BSA: 1.56, BMI: 25.1 kg/m2    PHYSICAL EXAM:    GENERAL: Alert and oriented x's 3.  Well appearing female in NAD.    HEENT: Pupils are equal and reactive without scleral icterus.  Normal cephalic, atraumatic,   PERRLA. EOM's intact bilaterally.    NECK: Trachea is midline.  Supple, no LAD, no thyromegaly.    BACK: No CVA or spinous tenderness.  LUNGS: non-labored respirations, no wheezing.    CARDIAC: RRR  ABDOMEN: Soft NTND  :    External Genitalia: Normal external genitalia. Hair distribution, small pigmented lesion at 10:00 position, benign appearing.    Urethral Meatus: No lesions.    Urethra: No masses no nodularity.    Bladder: Fullness, no masses, no tenderness, no scarring.    Vagina: Smooth vaginal mucosa. Scant old blood in vaginal vault    Cervix: Normal appearing cervix. Clear cervical discharge    Uterus: surgically absent.    Adnexa: Do not appreciate any pelvic masses or nodularity.     Parametria: No nodularity  RECTAL: Deferred.    EXTREMITIES: No clubbing, cyanosis, or edema bilaterally.    NEUROLOGIC: Cranial nerves are grossly intact bilaterally.    PSYCHIATRIC: Appropriate mood and affect.    ECOG:        LABS/PATHOLOGY:    None available for review today    DIAGNOSTIC STUDIES REVIEWED:    CT AP 12/30/2023:  CONCLUSION:  1. Complex-appearing multiloculated left ovarian cystic lesion. Although this could reflect a hemorrhagic cyst, the possibility of ovarian torsion should be evaluated by follow-up pelvic sonography.  2. No evidence of nephroureterolithiasis or obstructive uropathy.  3. Small volume free pelvic fluid, possibly functional/physiologic or reactive.  4. Status post appendectomy.  5. Lesser incidental findings as above.    US Pelvis 1/4/2024:  CONCLUSION:  1. Grossly stable complex cystic mass in the left adnexa measuring 7.8 cm, probably arising from the left ovary. Differential considerations include benign and malignant ovarian neoplasms, a  tubo-ovarian abscess or endometriosis.  Surgical consultation  is recommended.  2. Note that the patient reports a history of a right oophorectomy, however there is a structure in the right adnexa that resembles a normal-appearing ovary.  Correlate clinically with the patient's operative report.  3. Status post hysterectomy.    HEALTH MAINTENANCE:    Immunizations:      Screening:        Colonoscopy: 2023  Mammogram: 2023  Last pap smear: <5yrs ago, pt thinks 2023?  ASSESSMENT AND PLAN:  Adnexal mass  Rico Hurst is a 47y/o who presents for evaluation and treatment of her Newly diagnosed Adnexal Mass measuring 8cm.  After reviewing her records and physical exam findings I discussed with her possible etiologies of mass including benign vs. malignant vs. borderline tumor and the associated treatment recommended for these diagnosis.  At this time my recommendation is for surgical intervention.  The goal would include robotic assisted resection of mass with possible gelpoint assistance trachelectomy, bilateral salpingo-oophorectomy, pelvic washings and possible surgical staging including lymph node dissection, omentectomy and any other indicated procedure.     Given her significant surgical history, also discussed possible exploratory laparotomy, bowel resection, and colostomy. Reviewed risks and potential complications of surgery to include but not limited to: bleeding, infection, re-operation, bowel/bladder injury, DVT/PE, wound dehiscence, and colostomy. Advised patient to complete bowel prep prior to procedure.     At the time of surgery the mass will be sent to pathology for frozen inspection in an effort to identify benign vs. malignant vs borderline pathology.  If Borderline or Malignancy is identified then surgical staging will be performed including lymph node dissection and omentectomy.  Once final pathology is obtained I will then discuss with her if adjuvant treatment with chemotherapy is necessary.  I  reviewed risks of both surgeries including but not limited to infection, bleeding, re-operation, bowel/bladder/ureteral injury, recognized and unrecognized injury to surrounding organs, conversion to open laparotomy and anesthesia complications.  She will undergo appropriate pre-operative testing and medical clearance prior to purposed surgery. She was able to meet with our surgical coordinator and is tentatively scheduled to undergo this procedure with myself in the near future.

## 2024-02-09 NOTE — OPERATIVE REPORT
[facility] OPERATING ROOM  Operative Note     Rico Hurst Location: OR   CSN [id number] MRN [id number]   Admission Date 2/9/2024 Operation Date 2/9/2024   Attending Physician Edi Alvarado DO Operating Physician EDI ALVARADO DO      Preoperative Diagnosis: Left adnexal mass and dysfunctional uterine bleeding     Postoperative Diagnosis: Left adnexal mass and dysfunctional uterine bleeding     Procedure Performed:   Xi robotic extensive lysis of adhesions, left oophorectomy, removal of 7cm peritoneal cyst, trachelectomy, left ureterolysis, left lmph node sampling.       Primary Surgeon: EDI ALVARADO DO      Assistant: Mario Lopez     Surgical Findings: Upon entering the peritoneal cavity, the patient had an extensive amount of adhesions of the omentum to the anterior abdominal wall from near the falciform ligament all the way to the bladder reflection.  Furthermore this omentum was densely adhered to the left paracolic gutter all the way to the left pelvic sidewall.  The lyse of adhesions took at least 30 minutes.  More importantly her entire rectosigmoid colon was densely adhered to the left adnexal mass that required over 60 minutes of extensive enterolysis.  This mass was also densely adhered to the left pelvic sidewall and the retroperitoneal fashion over her left ureter was densely adhered to this mass from the level of the pelvic brim all the way into the ureteral orifice.  I spent approximately 30 minutes just performing a left ureterolysis secondary to retroperitoneal as well as intraperitoneal fibrosis.  Surprisingly, this mass was also adhered to the left external iliac artery lymph node.  Because of the adhesions, I did perform a lymph node sampling and removed those 2 lymph nodes secondary to dense adhesions and bleeding.  Lastly the patient continues to have menstrual cycles despite having a supracervical hysterectomy.  On MRI and on exam, the patient still had a lower uterine  segment with her uterine vessels completely intact.  I did perform a trachelectomy due to the patient's bleeding and pelvic pain.  This also included the lower uterine segment.  This was actually quite difficult due to the adhesions of the mass to the cervical stump as well as the fact that her bladder flap was adhered over the stump as well.  Nevertheless I was successful in taking out and performing a trachelectomy.  Frozen section came back as benign.  Of note her right ovary appeared to be within normal limits but it is densely adhered to the mesentery of the distal sigmoid colon.  I did not remove it in order to dissect those adhesions given the fact that the patient did want to keep that ovary.     Anesthesia: General     Complications: None     Implants: * No implants in log *     Specimen: Pelvic washings, left pelvic lymph node, left adnexal mass with a 7 cm peritoneal inclusion cyst, left ovary with ovarian mass, lower uterine segment and cervix     Drains: None     Condition: Stable     Estimated Blood Loss: Blood Output: 50 mL (2/9/2024  9:50 AM)      Urine Output:: 50 cc    Fluids: 1500 cc     Summary of Case:  Patient was taken back to the operating room and placed in a lithotomy position.  She then prepared and draped in the normal sterile fashion in dorsal lithotomy position.  Next a Elizabeth catheter was placed under sterile conditions.  Next a weighted speculum was then placed in the vagina cervix was identified grasped with a single-tooth tenaculum then sounded to approximately 3 cm dilated up to 7 mm and then 0 Prolene stitch was placed in the 12 o'clock.  ICG was then injected at the 3 and 9 o'clock position approximately 1 cc in each quadrant. The V-Care was then used and applied as directed.    Next attention was then turned to the abdomen where Mustafa's point was identified.  It was confirmed that the patient had an OG tube already placed.  At this time Mustafa's point was then injected with  Marcaine transected 5 mm and the Veress needle was then used to enter into the peritoneal cavity.  It was then tested and felt to be intraperitoneal and the abdomen was then insufflated with CO2 gas.  At this time a 5 mm air seal was then used as a Visiport with a 5 mm laparoscope and entered into the peritoneal cavity under direct visualization.  The remaining 4 ports were all placed under direct visualization 2 on each side of midline each approximately 8 mm in size.  The robot was then docked and I use a fenestrated bipolar the monopolar scissors and the pro-grasp in my robotic arms.  At this time I went to the console and began by getting pelvic washings.      I continue to perform an extensive amount of lysis of adhesions of the omentum from near the falciform ligament all the way down to the bladder reflection using monopolar cautery, bipolar cautery as well as sharp and blunt dissection.  I made sure I did not use any energy source on or near the bowel.  As I continue to dissect the omentum and finally freed it, I was able to see the left adnexal mass that was cystic in nature along with a large 7 cm peritoneal inclusion cyst.  This mass was densely adhered to the left paracolic gutter, left pelvic sidewall, rectosigmoid serosa as well as the mesentery of the rectosigmoid.  I spent at least 60 minutes performing an extensive lysis of adhesions and enterolysis in order to minimize any damage to the rectal serosa or mesentery.  There was at this time that I opened up the peritoneum lateral to what I thought to be was the IP ligament and continue to try to create the pararectal space.  I did identify the round ligament which I cauterized and transected while it was attached to the left adnexa.  I then used that pedicle to retract the ovary and adnexal mass medially and continue to try to identify the left ureter.  It was actually quite difficult due to the retroperitoneal fibrosis and the process identified  initially what I thought to be fat actually her left lymph node that was adhered to the mass as well.  Due to the bleeding and adhesive nature, I did remove I believe 2 lymph nodes off of the iliac vessels in order to mobilize and free this mass.  Once I did this, I was able to identify the ureter and follow it as it went all the way to the bladder.  It was densely adhered to the left adnexa.  I did create a defect in the medial leaf of the broad ligament between the IP ligament and the ureter.  I then cauterized the IP ligament in multiple areas and transected it with more stone in the body.  I then used this as a new pedicle and retracted the mass to the right and posteriorly and continued to dissect the ureter off of this mass all the way into the distal part of Skip.  Finally using mostly sharp dissection I continued to transect all the adhesive disease between the serosa of the rectosigmoid colon and this mass.  At no time did I create any enterotomies or sclerotomies.  Finally with the rectum off of this mass, I was able to identify the distal aspect of the adhesion of this mass to her bladder and to her cervical stump.  I then cauterized and transected this area and placed the left adnexal mass in the left paracolic gutter.  I then continued to create the bladder flap as the bladder serosa was adhered to the base of the cervical stump.  I use monopolar cautery as well as sharp and blunt dissection and created the vesicle cervical space and dissected all the way down to the cervicovaginal junction.  It was at this time that I cauterized the uterine vessels medially and transected it bilaterally.  I continued to take down the cardinal ligaments all the way down to the cervicovaginal junction at which time I also took down adhesions of the distal rectum to the cervix as well.  Now that the cervix was completely freed I then used the monopolar cautery to create my colpotomy and remove the cervix and lower  uterine segment through the vagina.  I then pulled the adnexal mass through the vagina and sent it to pathology.  Frozen section did come back as benign hemorrhagic cyst versus endometriotic cyst.  Regardless I closed the vaginal cuff using an oh V-Loc suture in a running fashion then backhanded all the way for added hemostasis.       At this time all the pedicles were reidentified and cauterized and felt to be hemostatic.  After lowering the pressure down to 7 for 5 minutes hemostasis was assured.  I did place snow in all the retroperitoneal spaces.  All the instruments were removed the gas was allowed to escape, and the trochars were also removed.  All the port sites were closed using a 4-0 Monocryl with Dermabond on the skin.  This end dictation I was present scrubbed and are performed the entire procedure from skin to skin.         ERASMO FRENCH,   2/9/2024  9:52 AM

## 2024-02-09 NOTE — ANESTHESIA POSTPROCEDURE EVALUATION
Patient: Rico Hurst    Procedure Summary       Date: 02/09/24 Room / Location: [facility] MAIN OR  / [facility] MAIN OR    Anesthesia Start: 0741 Anesthesia Stop:     Procedure: Xi robotic extensive lysis of adhesions, left oophorectomy, removal of 7cm peritoneal cyst, trachelectomy, left ureterolysis, left lmph node sampling. (Bilateral: Cervix) Diagnosis: (Left adnexal mass and dysfunctional uterine bleeding)    Surgeons: Edi Alvarado DO Anesthesiologist: Anmol Cohen MD    Anesthesia Type: general ASA Status: 2            Anesthesia Type: general    Vitals Value Taken Time   /84 02/09/24 1023   Temp 97.2 °F (36.2 °C) 02/09/24 1023   Pulse 75 02/09/24 1023   Resp 21 02/09/24 1023   SpO2 100 % 02/09/24 1023   Vitals shown include unfiled device data.    EM AN Post Evaluation:   Patient Evaluated in PACU  Patient Participation: complete - patient participated  Level of Consciousness: sleepy but conscious  Pain Score: 4  Pain Management: adequate  Airway Patency:patent  Dental exam unchanged from preop  Yes    Nausea/Vomiting: inadequate, being treated  Cardiovascular Status: blood pressure returned to baseline  Respiratory Status: nasal cannula  Postoperative Hydration acceptable      Sierra Medina CRNA  2/9/2024 10:24 AM

## 2024-02-13 LAB
BLOOD TYPE BARCODE: 5100
UNIT VOLUME: 350 ML

## 2024-02-29 ENCOUNTER — OFFICE VISIT (OUTPATIENT)
Dept: NEUROLOGY | Facility: CLINIC | Age: 49
End: 2024-02-29
Payer: COMMERCIAL

## 2024-02-29 VITALS — BODY MASS INDEX: 24.17 KG/M2 | WEIGHT: 128 LBS | HEIGHT: 61 IN

## 2024-02-29 DIAGNOSIS — R51.9 NEW ONSET HEADACHE: Primary | ICD-10-CM

## 2024-02-29 DIAGNOSIS — G43.109 MIGRAINE WITH AURA AND WITHOUT STATUS MIGRAINOSUS, NOT INTRACTABLE: ICD-10-CM

## 2024-02-29 NOTE — PATIENT INSTRUCTIONS
Migraine headache  Introduction  Migraines are extremely painful, recurring headaches that are sometimes accompanied by other symptoms, such as visual disturbances, for example, seeing an aura or nausea. There are 2 types of migraine:  Migraine with aura, formerly called common migraines   Migraine without aura, formerly called classic migraines  If you have a migraine with aura, you may see things, such as stars, or zigzag lines, or have a temporary blind spot about 30 minutes before the headache starts. Even if you do not experience an aura, you may have other warning signs in the period before the headaches starts, such as a craving for sweets, thirst, sleepiness, or depression.  Although there is no cure for migraines, you can manage the condition by reducing the frequency of attacks and reducing pain once an attack starts.  Signs and Symptoms  The headache from a migraine, with or without aura, has the following characteristics:  Throbbing, pounding, or pulsating pain   Often begins on one side of your head and may spread to both or stay on one side   Intense pain is often concentrated around the sides of the forehead   Can last from 4 to 72 hours   These symptoms may happen at the same time or before the headache:  Nausea and vomiting   Dizziness, lightheadedness, or vertigo (feeling like the room is spinning)   Loss of appetite   Fatigue   Visual disturbances, like seeing flashing lights or zigzag lines, temporary blind spots, or blurred vision   Parts of your body may feel numb, weak, or tingly   Light, noise, and movement, especially bending over, worsen head pain. You want to lie down in a dark, quiet room.   Irritability   Symptoms that may linger even after the headache is gone:  Feeling mentally dull like your thinking is not clear or sharp   Sleepiness   Neck pain   Causes  Researchers are not sure what causes a migraine, but they know it involves changes in the blood flow to the brain. At first,  blood vessels narrow or constrict, reducing blood flow and leading to visual disturbances, difficulty speaking, weakness, numbness, or tingling sensation in one area of the body, or other similar symptoms. Later, the blood vessels dilate or enlarge, leading to increased blood flow and a severe headache.  There also seems to be a genetic link to migraine headaches. More than half of people with migraines have an affected family member. Migraine triggers can include the following:  Alcohol, especially beer and red wine   Certain foods, such as aged cheeses, chocolate, nuts, peanut butter, some fruits (like avocado, banana, and citrus), foods with monosodium glutamate (MSG), onions, dairy products, meats containing nitrates (salgado, hot dogs, salami, and cured meats) fermented or pickled foods   Skipping meals   Crying   Fluctuations in hormones, for example during pregnancy, before and during your period, and menopause   Certain odors, such as perfume or smoke   Bright lights   Loud noises   Stress, physical or emotional. The headache often happens when a person is relaxing after a particularly stressful time.   Sleeping too little or too much   Caffeine   Smoking or exposure to tobacco smoke   Some medications   Heat, high humidity, and high altitude   Headache medications. Using headache medications excessively can lead to more frequent and more severe headaches. Called medication overuse headaches, these headaches may complicate every type of headache, including migraine.   Risk Factors  Gender. Women are nearly 3 times more likely to get migraines than men.   Having other family members with migraine headaches   Being under age 40; migraines tend to get better as you age   Taking birth control pills, if your migraines are affected by changes in estrogen levels   Exposure and sensitivity to any of the potential triggers listed above   Diagnosis  Your doctor will take a detailed medical history so he or she can  determine whether you have a migraine or another kind of headache, such as a tension or sinus headache. Your doctor will ask questions about when your headaches occur, how long they last, how often they come on, the location of the pain, and any symptoms that accompany or precede the headaches. Sometimes it helps to keep a diary about your headaches before seeing the doctor, so you'll have an accurate recording of how often they happen. (See Lifestyle section for what information to include in a diary.)  Tests your doctor may order, depending on your symptoms and exam, include:  Computerized tomography (CT) scan, to look for other problems that could be causing your headache   Magnetic resonance imaging (MRI), to look for brain abnormalities, and to look closely at the blood vessels in the brain   Lumbar puncture (spinal tap), if your doctor suspects meningitis or other conditions   Call 911 or go to the emergency room if you have the following symptoms:  You have unusual symptoms you have not experienced before, such as speech problems, change in vision, loss of balance, or difficulty moving a limb   Your headache pattern or intensity is different   You are experiencing \"the worst headache of your life\"   Your headache gets worse when you are lying down   These may indicate a stroke, a bleed in the brain, or other serious condition.   Treatment Approach  Treatment for migraines is aimed at preventing them from happening and reducing pain once an attack starts.  You can control your migraines with a combination of medications, lifestyle changes, and complementary therapies. Biofeedback may help control the initial contraction of blood vessels. Relaxation techniques may reduce both the frequency and intensity of attacks.  Lifestyle  Keeping a migraine diary, particularly when you first begin to have migraines, can help identify the triggers for your headaches so you can avoid them. When a migraine happens, write down  the date and time it started. Note what you ate for the preceding 24 hours, how long you slept the night before, what you were doing just before the headache, any unusual stress in your life, how long the headache lasted, and what you did to make it stop.  Other lifestyle measures that may reduce the number of migraines include:  Avoiding cigarettes, caffeine, and alcohol   Exercising regularly   Getting enough sleep each night   Relaxing and reducing stress in your life (see Mind-Body Medicine section)   Eating regular meals   Once a headache or migraine symptoms begin, it helps to:   Rest in a quiet, darkened room   Drink fluids to avoid dehydration, especially if you have vomited   Medications  Medications for migraines can be classified in two major categories: those designed to prevent attacks, and those designed to relieve pain.  Drugs for Prevention  Your doctor may prescribe medications to prevent migraines if you have 2 or more migraines per month, use pain relievers more than twice a week, or if your symptoms are especially debilitating. Depending on your condition and medication, your doctor may recommend taking the medication daily or when a known trigger is about to happen.  Beta-blockers. Also used to treat heart disease; researchers are not sure why they work for migraines, although they may help keep blood vessels in the brain from constricting and dilating. Beta-blockers include:   Atenolol (Tenormin)   Metoprolol (Lopressor, Toprol-XL)   Propranolol (Inderal, Inderal LA)   Calcium-channel blockers. Another heart disease drug that can help prevent migraines, including:   Verapamil (Calan, Isoptin)   Diltiazem (Cardizem, Dilacor)   Antidepressants. Tricyclic antidepressants are helpful in preventing all kinds of headaches, including migraines. Tricyclic antidepressants include:   Amitriptyline (Elavil)   Nortriptyline (Pamelor)   Doxepin (Sinequan)   Imipramine (Tofranil)   Anticonvulsants. Some  antiseizure drugs help prevent migraines, although researchers are not sure why:   Divalproex sodium (Depakote)   Gabapentin (Neurontin)   Topiramate (Topamax)   Botox. Botox, a medication made from a purified form of botulinum toxin, has been approved to treat migraines. Researchers are not sure why it helps some people. To treat migraines, Botox is given as a series of injections in the forehead, temples, back of the neck, and shoulders. Injections are given about every 3 months.   Drugs for Treatment  To work, these medications should be taken as soon as you feel a migraine coming on.  Triptans. These medications are often the first ones prescribed to relieve pain, nausea, and sensitivity to light and sound. They work by constricting the blood vessels in the brain. Triptans include:   Almotriptan (Axert)   Eletriptan (Relpax)   Frovatriptan (Frova)   Naratriptan (Amerge)   Rizatriptan (Maxalt)   Sumatriptan (Imitrex)   Zolmitriptan (Zomig)   Ergots. Ergots also work by constricting blood vessels, but tend to have more side effects than triptans. Ergots include:   Dihydroergotamine (Migranal)   Ergotamine (Ergomar, Cafergot)   Isometheptene, dichloralphenazone, and acetaminophen (Midrin). Midrin combines a pain reliever (acetaminophen) and sedative (dichloralphenazone) with a medication that constricts blood vessels (isometheptene) to prevent migraines.   Other medications used to treat the headache pain or associated symptoms:  Antinausea drugs   Acetaminophen, aspirin, and caffeine (Excedrin Migraine) is an FDA-approved, over-the-counter treatment for migraine.   Ibuprofen (Advil Migraine, Motrin Migraine) is also an FDA-approved, over-the-counter migraine medication.   Narcotics, such as codeine, are sometimes used for people who can't take triptans or ergots, however, they can cause dependency and rebound headaches.   CGRP Antagnonists. Nurtec and Ubrelvy  Nutrition and Dietary Supplements  Diet  The following  foods may trigger migraine headaches:  Chocolate   Cheese   Monosodium glutamate (MSG), a flavor enhancer found often in food from [employer] restaurants   Foods containing the amino acid tyramine, found in red wine, aged cheese, smoked fish, chicken livers, figs, and some beans   Nuts   Peanut butter   Some fruits, like avocado, banana, and citrus   Onions   Dairy products   Meats containing nitrates, such as salgado, hot dogs, salami, cured meats   Fermented or pickled foods   If you think that any of these foods cause your migraines, try eliminating all the items on this list from your diet and then reintroducing them one at a time. Pay close attention to when the number of headaches increases after eating certain foods. Then you know which trigger foods to avoid. You may also want to consider food allergy testing to determine your specific sensitivities or triggers.  Supplements  5-hydroxytryptophan (5-HTP). Your body makes the amino acid 5-HTP and converts it into serotonin, an important brain chemical. Researchers think abnormal serotonin function in blood vessels may be related to migraines, and some of the drugs used to treat migraines work by affecting serotonin. Several studies indicate that 5-HTP may be as effective as some prescription migraine medications at reducing the intensity and frequency of attacks. But not all studies agree. One study found that 5-HTP was less effective than the beta-blocker Inderal. More studies are needed to be sure that 5-HTP is helpful in treating migraines. If you have a history of psychiatric illness, take an antidepressant, or supplements such as [place] wort or SAMe, you should not take 5-HTP except under your doctor's supervision. If you are pregnant or breastfeeding, do not take 5-HTP without first asking your doctor.   Magnesium 400 mg/day. People with migraines often have lower levels of magnesium than people who do not have migraines, and several studies suggest that  magnesium may reduce the frequency of migraine attacks in people with low levels of magnesium. In one study, people who took magnesium reduce the frequency of attacks by 41.6%, compared to 15.8% in those who took placebo. Some studies also suggest that magnesium may help women whose migraines are triggered by their periods. Side effects from magnesium can include lower blood pressure and diarrhea. Magnesium can interact with medications, including heart medications, diuretics or water pills, some antibiotics, and muscle relaxers.   Vitamin B2 (riboflavin) 400 mg/day. A few studies indicate that riboflavin may reduce the frequency and duration of migraines. In one study, people who took riboflavin had more than a 50% decrease in the number of attacks. Not all studies have found riboflavin to be effective, however. More research is needed. Riboflavin can interact with some medications, including tricyclic antidepressants, medications called anticholinergic drugs that are used to treat a variety of conditions, the antiseizure drug phenobarbital, and probenecid, used to treat gout.   Preliminary research indicates that these supplements may also help prevent migraines, although more research is needed to say for sure:  Coenzyme Q10 (CoQ10). CoQ10 can interact with several medications including blood thinners, such as warfarin (Coumadin), some cancer medications, and medications for high blood pressure.   Melatonin. Melatonin can interact with a number of medications, so ask your doctor before taking it.     Acupuncture  Acupuncture has been studied as a treatment for migraine headache for more than 20 years. While not all studies have shown it helps, researchers agree that acupuncture appears safe, and may work for some people. A study published in 2003 suggest that getting an acupuncture treatment when migraine symptoms first start works as well as taking the drug Imitrex. As symptoms continue, however, the medication  works better than acupuncture.  In addition to needling treatment, acupuncturists may recommend lifestyle changes, such as suggestions for specific breathing techniques, qi gong exercise, and dietary changes.  Mind-Body Medicine  Reducing and learning to cope with stress may help reduce the number and intensity of your headaches. Techniques that can help include:  Self-hypnosis   Biofeedback   Joining a support group   Relaxation techniques, such as progressive muscle relaxation (alternately jeovany and releasing muscles throughout your body), meditation, and guided imagery   Psychotherapy   Other Considerations  Pregnancy  Many of the medications, herbs, and supplements used to prevent or treat migraines should not be used during pregnancy. Talk to your doctor before using any medication, over-the-counter or prescription, or any complementary therapy before or during your pregnancy. Some doctors may recommend treating mild-to-moderate attacks during pregnancy with acetaminophen (Tylenol).  Warnings and Precautions  Use medications only as directed. Using some medications on a regular basis can cause rebound headaches.  Call your doctor if you experience a new headache, a change in quality of a previous headache or headache pattern, or if a medication that usually takes away the pain no longer works.  Prognosis and Complications  Migraine headaches generally do not pose a threat to your overall health, although they can be chronic, recurrent, frustrating, and interfere with your daily life. Stroke is an extremely rare complication from severe migraines. Other studies show that migraine headaches are associated with heart disease. People who have migraines are up to 4 times more likely to suffer from depression. Migraine patients also have an increased rate of anxiety and panic disorders.  Adolescent migraine is associated with inflammatory conditions, such as asthma and seasonal allergies, as well as with  epilepsy, persistent nightmares, and motion sickness. Many people find that migraines go into remission, meaning they stop for a long time and happen only very infrequently, or even disappear altogether, especially as you get older. For women, this may be related to lower levels of estrogen after menopause.     If your migraine attacks are becoming increasingly more frequent, if you are suffering attacks of disabling migraine despite optimal use of acute therapies, if you are experiencing some degree of headache more days than not, or if your migraine has evolved to the point where you are experiencing headache on a daily or near-daily basis, then it is time to consider prophylactic therapy to reduce attack frequency or, if your migraine has “chronified” and become daily or near-daily, suppression therapy to help you achieve remission back to episodic migraine.    As with acute migraine treatment, migraine prevention/suppression therapy may involve nonpharmacologic measures as well as medications; examples of the former include an aerobic conditioning program, avoidance of migraine attack “triggers” and striving to maintain good sleep hygiene. While acute migraine may respond to nonpharmacologic interventions alone, chronic migraine typically requires effective use of both medications intended for acute migraine treatment and medications for prevention/suppression. Before a specific medication for prevention/ suppression is selected, there are some important treatment issues to keep in mind:     Take an adequate dose of the medication for an adequate duration    Many patients come to clinic and report they have failed “everything” they have tried for migraine prevention/suppression. When asked about specific drugs, however, it often turns out the patient took the given medication only at a very low dose and for only a short period of time, discontinuing therapy because of side effects, lack of improvement or both.  The orally administered prevention/ suppression therapies known to be effective for migraine prevention/suppression may require a substantial period of treatment at a therapeutic dose to begin to exert any clinically detectable beneficial effect, and in some cases - so as to minimize drug-related side effects - it is necessary to begin treatment at a low dose and subsequently increase to a target dose over a period of weeks. This can be an especially tough time for a patient who is experiencing headache on a daily or near-daily basis. There is no current therapy for migraine prevention/suppression that has better than a coin flip’s (50%) chance of promoting significant improvement, and it is difficult for your physician to predict up-front whether the treatment prescribed will be effective. Thus, pharmacologic treatment intended for migraine prevention/suppression is a matter of educated trial and error, a process which can prove to be frustrating for patients and physicians alike.     Don’t stick with a loser    If you have been taking an adequate dose of a prevention/suppression medication for an adequate period of time and have not experienced a significant improvement in your headache disorder, then it is either the wrong dose or the wrong drug for you. It is time for a change. Call your doctor.     Treat “break-through” headache attacks aggressively    Aggressive treatment of acute migraine attacks or intensifications will assist in reducing the likelihood of subsequent attacks and in achieving remission of chronic migraine back to its episodic form. Headache tends to beget headache, and if you are attempting to reduce your attack frequency or reverse the headache chronification process, experiencing prolonged attacks of severe migraine will work against that effort. And remember: treat early!     Avoid overuse of symptomatic (acute) medication    There is solid scientific evidence to suggest that overuse of  symptomatic medication reinforces chronification and that ceasing overuse may be accompanied by clinical improvement. Remember: restrict your use of the simple analgesics to no more than 15 days per month and your use of virtually all prescription medications intended for acute migraine treatment to no more than 10 days per month. Of all the medications available for acute migraine treatment, the nonsteroidal anti-inflammatory drugs appear to have the lowest potential for promoting medication overuse headache.     Chronic, indefinite use of a medication for prevention/suppression of migraine rarely is required    Many patients will not require chronic treatment with a prevention/suppression therapy for periods exceeding 6 months to a year. The goal of prevention/therapy is “headache-free or nearly so”… again at the cost of no or minimal side effects. Once you have reached that point and remained there for 4 to 6 months, it is quite possible that you can taper off your prevention/ suppression medication and that you will not require such therapy again for months to years.    “To Do” checklist for improving your headache control  1. Initiate (and stick to) an aerobic conditioning program.    2. Keep a careful headache diary.    3. Maintain good sleep hygiene.    4. Eat nutritional, regularly spaced meals.    5. Treat acute attacks of migraine early and aggressively.    6. Don’t overuse symptomatic medication.    7. If you require prevention/suppression therapy, give the specific therapy a decent chance.    8. If you suffer from a migraine “co-morbidity,” such as chronic insomnia, anxiety, depression, or other medical problems, seek treatment for that condition.    9. Regularly set aside time for personal relaxation and stress reduction.    10. Give back . . . to your family, friends, community. No matter how badly your migraine makes you feel, you will deal with your headache disorder more effectively if you are  actively engaged in the world around you.

## 2024-02-29 NOTE — PROGRESS NOTES
[facility]  [address] [place] [address], [address]  [facility] [address]  [phone]            Neurology Initial Visit     Referred By: Dr. Mullen    Chief Complaint:   Chief Complaint   Patient presents with    Headache     New patient presents with headache, that started about the last year. Patient states that the pain was on the right temple. Patient states she had nausea. Patient is taking ibuprofen 600 MG Oral Tab. Pt is not taking sumatriptan       HPI:     Rico Hurst is a 48 year old female, who presents for history of new onset headaches.  Since 2023 patient description of headaches.  She has never had them before.  Essentially always on the right side, lasting for at least few hours.  Typically sleep will get better.  She feels nauseated with some of them, with most severe ones show with careful light sensitive, no sensitivity, it would last through the next day sometimes.  Symptoms she feels, or feeling in the right side of the temple.  She denies any focal neurological symptoms otherwise.  No numbness, no tingling, no weakness, no trouble with speech or vision.  She did have some ovaries removed due to the cyst and actually since then her headaches became less frequent.  She was prescribed sumatriptan but has not had a chance to try it yet..     Past Medical History:   Diagnosis Date    History of blood transfusion     [employer] [facility]    PONV (postoperative nausea and vomiting)        Past Surgical History:   Procedure Laterality Date    APPENDECTOMY      COLONOSCOPY N/A 3/9/2023    Procedure: COLONOSCOPY;  Surgeon: Kb Yip MD;  [facility]    HYSTERECTOMY      supracervical hysterectomy, RSO    MYOMECTOMY 5/> INTRAMURAL MYOMAS &/OR TOTAL WT >250 GMS, ABDOMINAL APPROACH      x 4       Social history:  History   Smoking Status    Never   Smokeless Tobacco    Never     History   Alcohol Use Not Currently     History   Drug Use Unknown       No family history on  file.      Current Outpatient Medications:     ibuprofen 600 MG Oral Tab, Take 1 tablet (600 mg total) by mouth every 8 (eight) hours as needed for Pain., Disp: 60 tablet, Rfl: 0    docusate sodium 100 MG Oral Cap, Take 1 capsule (100 mg total) by mouth 2 (two) times daily as needed for constipation., Disp: 60 capsule, Rfl: 0    HYDROcodone-acetaminophen 5-325 MG Oral Tab, Take 1 tablet by mouth every 6 (six) hours as needed for Pain., Disp: 20 tablet, Rfl: 0    SUMAtriptan (IMITREX) 50 MG Oral Tab, Take 1 tablet (50 mg total) by mouth every 2 (two) hours as needed for Migraine. Use at onset; repeat once after 2 HRS-ONLY 2 IN 24 HR MAX, Disp: 9 tablet, Rfl: 1    Multiple Vitamin (MULTI VITAMIN DAILY OR), Take by mouth., Disp: , Rfl:     No Known Allergies    ROS:   As in HPI, the rest of the 14 system review was done and was negative      Physical Exam:  Vitals:    02/29/24 0937   Weight: 128 lb (58.1 kg)   Height: 61\"       General: No apparent distress, well nourished, well groomed.  Head- Normocephalic, atraumatic  Eyes- No redness or swelling  ENT- Hearing intake, normal glutition  Neck- No masses or adenopathy  Cv: pulses were palpable and normal, no cyanosis or edema     Neurological:     Mental Status- Alert and oriented x3.  Normal attention span and concentration  Thought process intact  Memory intact- recent and remote  Mood intact  Fund of knowledge appropriate for education and age    Language intact including: comprehension, naming, repetition, vocabulary    Cranial Nerves:  II.- Visual fields full to confrontation  V. Facial sensation intact  VII. Face symmetric, no facial weakness  VIII. Hearing intact to whisper.  IX. Pallet elevates symmetrically.  XI. Shoulder shrug is intact  XII. Tongue is midline    Motor Exam:  Muscle tone normal  No atrophy or fasciculations  Strength- upper extremities 5/5 proximally and distally                  - lower  extremities 5/5 proximally and distally    Sensory  Exam:  Light touch sensation- intact in all 4 extremities      Coordination:  Finger to nose intact  Rapid alternating movements intact    Gait:  Normal posture  Normal physiologic      Labs:    Lab Results   Component Value Date    TSH 1.420 12/15/2022     Lab Results   Component Value Date    HDL 65 (H) 12/15/2022    LDL 66 12/15/2022    TRIG 55 12/15/2022     Lab Results   Component Value Date    HGB 12.8 01/18/2024    HCT 37.3 01/18/2024    MCV 86.3 01/18/2024    WBC 5.3 01/18/2024    .0 01/18/2024      Lab Results   Component Value Date    BUN 16 01/18/2024    CA 9.5 01/18/2024    ALT 8 (L) 01/18/2024    AST 11 01/18/2024    ALB 4.1 01/18/2024     01/18/2024    K 4.3 01/18/2024     01/18/2024    CO2 32.0 01/18/2024      I have reviewed labs.      Assessment   1. New onset headache  Patient most likely with new onset of migraines.  Some family history of biological mother with headaches.  At this point patient decided not to consider any preventive treatments.  Some information provided to look for possible triggers and lifestyle modifications.  If the headaches get worse she will come back.  With emphasized the point of not overusing analgesics.  Also taking risk medications at the outset of the headache was emphasized.    2. Migraine with aura and without status migrainosus, not intractable  In the meantime she will try sumatriptan if is not successful and after that she will come back to discuss some other treatments           Education and counseling provided to patient. Instructed patient to call my office or seek medical attention immediately if symptoms worsen.  Patient verbalized understanding of information given. All questions were answered. All side effects of drugs were discussed.     Return to clinic in: Return in about 6 months (around 8/29/2024).    Fredrick Adams MD

## 2024-08-23 ENCOUNTER — E-VISIT (OUTPATIENT)
Dept: TELEHEALTH | Age: 49
End: 2024-08-23
Payer: COMMERCIAL

## 2024-08-23 ENCOUNTER — TELEPHONE (OUTPATIENT)
Dept: FAMILY MEDICINE CLINIC | Facility: CLINIC | Age: 49
End: 2024-08-23

## 2024-08-23 ENCOUNTER — TELEPHONE (OUTPATIENT)
Dept: OBGYN CLINIC | Facility: CLINIC | Age: 49
End: 2024-08-23

## 2024-08-23 DIAGNOSIS — R39.9 UTI SYMPTOMS: Primary | ICD-10-CM

## 2024-08-23 DIAGNOSIS — Z00.00 ROUTINE MEDICAL EXAM: ICD-10-CM

## 2024-08-23 DIAGNOSIS — E55.9 VITAMIN D DEFICIENCY: ICD-10-CM

## 2024-08-23 DIAGNOSIS — Z12.31 BREAST CANCER SCREENING BY MAMMOGRAM: Primary | ICD-10-CM

## 2024-08-23 NOTE — TELEPHONE ENCOUNTER
Patient last seen by Betty Wright 2/13/23. Patient aware can call PCP for symptoms, since it has been over 1 year since patient was seen, patient aware we can not call in medication for patient if it has been over 1 year.

## 2024-08-26 ENCOUNTER — LAB ENCOUNTER (OUTPATIENT)
Dept: LAB | Facility: HOSPITAL | Age: 49
End: 2024-08-26
Attending: PHYSICIAN ASSISTANT
Payer: COMMERCIAL

## 2024-08-26 ENCOUNTER — TELEPHONE (OUTPATIENT)
Dept: FAMILY MEDICINE CLINIC | Facility: CLINIC | Age: 49
End: 2024-08-26

## 2024-08-26 DIAGNOSIS — R39.9 UTI SYMPTOMS: ICD-10-CM

## 2024-08-26 LAB
BILIRUB UR QL: NEGATIVE
CLARITY UR: CLEAR
GLUCOSE UR-MCNC: NORMAL MG/DL
HGB UR QL STRIP.AUTO: NEGATIVE
KETONES UR-MCNC: NEGATIVE MG/DL
LEUKOCYTE ESTERASE UR QL STRIP.AUTO: 500
NITRITE UR QL STRIP.AUTO: NEGATIVE
PH UR: 7 [PH] (ref 5–8)
PROT UR-MCNC: NEGATIVE MG/DL
SP GR UR STRIP: 1.01 (ref 1–1.03)
UROBILINOGEN UR STRIP-ACNC: NORMAL

## 2024-08-26 PROCEDURE — 81001 URINALYSIS AUTO W/SCOPE: CPT

## 2024-08-26 PROCEDURE — 87086 URINE CULTURE/COLONY COUNT: CPT

## 2024-08-26 RX ORDER — NITROFURANTOIN 25; 75 MG/1; MG/1
100 CAPSULE ORAL 2 TIMES DAILY
Qty: 14 CAPSULE | Refills: 0 | Status: SHIPPED | OUTPATIENT
Start: 2024-08-26 | End: 2024-09-02

## 2024-08-26 NOTE — TELEPHONE ENCOUNTER
[facility] called, verified patient's Name and . States patient is there currently stating that she needs to have her urine tested, however, there is no order. Spoke to the patient and states that she thinks she has urinary tract infection and is requesting for urine testing. She scheduled an E-visit so she can get the order.    Chart reviewed. Patient had an E-visit with Rima Alvarez PA-C. No order placed. Advised to call provider to follow up on the order, contact information provided, verbalized understanding. Patient is very upset that she did not get the order after spending almost an hour answering questions online regarding her symptoms.

## 2024-08-26 NOTE — PROGRESS NOTES
Rico Hurst is a 48 year old female.  HPI:   See answers to questions above.     Current Outpatient Medications   Medication Sig Dispense Refill    nitrofurantoin monohydrate macro 100 MG Oral Cap Take 1 capsule (100 mg total) by mouth 2 (two) times daily for 7 days. 14 capsule 0    docusate sodium 100 MG Oral Cap Take 1 capsule (100 mg total) by mouth 2 (two) times daily as needed for constipation. 60 capsule 0    HYDROcodone-acetaminophen 5-325 MG Oral Tab Take 1 tablet by mouth every 6 (six) hours as needed for Pain. 20 tablet 0    ibuprofen 600 MG Oral Tab Take 1 tablet (600 mg total) by mouth every 8 (eight) hours as needed for Pain. 60 tablet 0    SUMAtriptan (IMITREX) 50 MG Oral Tab Take 1 tablet (50 mg total) by mouth every 2 (two) hours as needed for Migraine. Use at onset; repeat once after 2 HRS-ONLY 2 IN 24 HR MAX 9 tablet 1    Multiple Vitamin (MULTI VITAMIN DAILY OR) Take by mouth.        Past Medical History:    History of blood transfusion    [employer] [facility]    PONV (postoperative nausea and vomiting)      Past Surgical History:   Procedure Laterality Date    Appendectomy      Colonoscopy N/A 3/9/2023    Procedure: COLONOSCOPY;  Surgeon: Kb Yip MD;  Location: [facility] ENDO    Hysterectomy      supracervical hysterectomy, RSO    Myomectomy 5/> intramural myomas &/or total wt >250 gms, abdominal approach      x 4      No family history on file.   Social History:  Social History     Socioeconomic History    Marital status:    Tobacco Use    Smoking status: Never    Smokeless tobacco: Never   Vaping Use    Vaping status: Never Used   Substance and Sexual Activity    Alcohol use: Not Currently    Drug use: Not Currently     Types: Cannabis    Sexual activity: Yes     Birth control/protection: Hysterectomy     Social Determinants of Health      [facility] [employer], [facility]         ASSESSMENT AND PLAN:     Encounter Diagnosis   Name Primary?    UTI symptoms Yes      Pt at lab currently - U/A and urine cx orders placed. If u/a suggestive of UTI then will send in abx.     8/26/24- U/A showed large leuks. Sent in nitrofurantoin for pt to start.       Meds & Refills for this Visit:  Requested Prescriptions     Signed Prescriptions Disp Refills    nitrofurantoin monohydrate macro 100 MG Oral Cap 14 capsule 0     Sig: Take 1 capsule (100 mg total) by mouth 2 (two) times daily for 7 days.       Duration of  the service:  25 minutes    Patient advised to follow up with PCP if no improvement or worsening of symptoms  Refer to MyChart message for specific patient instructions

## 2024-08-26 NOTE — TELEPHONE ENCOUNTER
Dr. Winslow, patient is requesting a mammogram order. Previous mammogram was completed 05/25/23. Please advise on order.

## 2024-09-03 ENCOUNTER — LAB ENCOUNTER (OUTPATIENT)
Dept: LAB | Facility: HOSPITAL | Age: 49
End: 2024-09-03
Attending: FAMILY MEDICINE
Payer: COMMERCIAL

## 2024-09-03 DIAGNOSIS — E55.9 VITAMIN D DEFICIENCY: ICD-10-CM

## 2024-09-03 DIAGNOSIS — Z00.00 ROUTINE MEDICAL EXAM: ICD-10-CM

## 2024-09-03 DIAGNOSIS — R63.5 WEIGHT GAIN: ICD-10-CM

## 2024-09-03 LAB
ALBUMIN SERPL-MCNC: 4.4 G/DL (ref 3.2–4.8)
ALBUMIN/GLOB SERPL: 1.4 {RATIO} (ref 1–2)
ALP LIVER SERPL-CCNC: 69 U/L
ALT SERPL-CCNC: <7 U/L
ANION GAP SERPL CALC-SCNC: 6 MMOL/L (ref 0–18)
AST SERPL-CCNC: 11 U/L (ref ?–34)
BASOPHILS # BLD AUTO: 0.02 X10(3) UL (ref 0–0.2)
BASOPHILS NFR BLD AUTO: 0.3 %
BILIRUB SERPL-MCNC: 0.7 MG/DL (ref 0.3–1.2)
BUN BLD-MCNC: 16 MG/DL (ref 9–23)
BUN/CREAT SERPL: 18 (ref 10–20)
CALCIUM BLD-MCNC: 10.1 MG/DL (ref 8.7–10.4)
CHLORIDE SERPL-SCNC: 111 MMOL/L (ref 98–112)
CHOLEST SERPL-MCNC: 157 MG/DL (ref ?–200)
CO2 SERPL-SCNC: 26 MMOL/L (ref 21–32)
CREAT BLD-MCNC: 0.89 MG/DL
DEPRECATED RDW RBC AUTO: 39.6 FL (ref 35.1–46.3)
EGFRCR SERPLBLD CKD-EPI 2021: 81 ML/MIN/1.73M2 (ref 60–?)
EOSINOPHIL # BLD AUTO: 0.17 X10(3) UL (ref 0–0.7)
EOSINOPHIL NFR BLD AUTO: 2.8 %
ERYTHROCYTE [DISTWIDTH] IN BLOOD BY AUTOMATED COUNT: 12.7 % (ref 11–15)
FASTING PATIENT LIPID ANSWER: YES
FASTING STATUS PATIENT QL REPORTED: YES
GLOBULIN PLAS-MCNC: 3.1 G/DL (ref 2–3.5)
GLUCOSE BLD-MCNC: 83 MG/DL (ref 70–99)
HCT VFR BLD AUTO: 38.3 %
HDLC SERPL-MCNC: 48 MG/DL (ref 40–59)
HGB BLD-MCNC: 13.2 G/DL
IMM GRANULOCYTES # BLD AUTO: 0.04 X10(3) UL (ref 0–1)
IMM GRANULOCYTES NFR BLD: 0.7 %
LDLC SERPL CALC-MCNC: 94 MG/DL (ref ?–100)
LYMPHOCYTES # BLD AUTO: 1.26 X10(3) UL (ref 1–4)
LYMPHOCYTES NFR BLD AUTO: 20.6 %
MCH RBC QN AUTO: 29.5 PG (ref 26–34)
MCHC RBC AUTO-ENTMCNC: 34.5 G/DL (ref 31–37)
MCV RBC AUTO: 85.5 FL
MONOCYTES # BLD AUTO: 0.71 X10(3) UL (ref 0.1–1)
MONOCYTES NFR BLD AUTO: 11.6 %
NEUTROPHILS # BLD AUTO: 3.93 X10 (3) UL (ref 1.5–7.7)
NEUTROPHILS # BLD AUTO: 3.93 X10(3) UL (ref 1.5–7.7)
NEUTROPHILS NFR BLD AUTO: 64 %
NONHDLC SERPL-MCNC: 109 MG/DL (ref ?–130)
OSMOLALITY SERPL CALC.SUM OF ELEC: 296 MOSM/KG (ref 275–295)
PLATELET # BLD AUTO: 161 10(3)UL (ref 150–450)
POTASSIUM SERPL-SCNC: 4.7 MMOL/L (ref 3.5–5.1)
PROT SERPL-MCNC: 7.5 G/DL (ref 5.7–8.2)
RBC # BLD AUTO: 4.48 X10(6)UL
SODIUM SERPL-SCNC: 143 MMOL/L (ref 136–145)
TRIGL SERPL-MCNC: 78 MG/DL (ref 30–149)
TSI SER-ACNC: 1.71 MIU/ML (ref 0.55–4.78)
VIT B12 SERPL-MCNC: 537 PG/ML (ref 211–911)
VIT D+METAB SERPL-MCNC: 35.5 NG/ML (ref 30–100)
VLDLC SERPL CALC-MCNC: 13 MG/DL (ref 0–30)
WBC # BLD AUTO: 6.1 X10(3) UL (ref 4–11)

## 2024-09-03 PROCEDURE — 83001 ASSAY OF GONADOTROPIN (FSH): CPT

## 2024-09-03 PROCEDURE — 82306 VITAMIN D 25 HYDROXY: CPT

## 2024-09-03 PROCEDURE — 80053 COMPREHEN METABOLIC PANEL: CPT

## 2024-09-03 PROCEDURE — 36415 COLL VENOUS BLD VENIPUNCTURE: CPT

## 2024-09-03 PROCEDURE — 84443 ASSAY THYROID STIM HORMONE: CPT

## 2024-09-03 PROCEDURE — 80061 LIPID PANEL: CPT

## 2024-09-03 PROCEDURE — 82607 VITAMIN B-12: CPT

## 2024-09-03 PROCEDURE — 85025 COMPLETE CBC W/AUTO DIFF WBC: CPT

## 2024-09-04 ENCOUNTER — OFFICE VISIT (OUTPATIENT)
Dept: FAMILY MEDICINE CLINIC | Facility: CLINIC | Age: 49
End: 2024-09-04
Payer: COMMERCIAL

## 2024-09-04 VITALS
HEART RATE: 59 BPM | DIASTOLIC BLOOD PRESSURE: 63 MMHG | WEIGHT: 130.81 LBS | SYSTOLIC BLOOD PRESSURE: 95 MMHG | HEIGHT: 61 IN | BODY MASS INDEX: 24.7 KG/M2

## 2024-09-04 DIAGNOSIS — R63.5 WEIGHT GAIN: ICD-10-CM

## 2024-09-04 DIAGNOSIS — Z00.00 ROUTINE MEDICAL EXAM: Primary | ICD-10-CM

## 2024-09-04 DIAGNOSIS — Z90.710 S/P HYSTERECTOMY: ICD-10-CM

## 2024-09-04 DIAGNOSIS — G43.919 INTRACTABLE MIGRAINE WITHOUT STATUS MIGRAINOSUS, UNSPECIFIED MIGRAINE TYPE: ICD-10-CM

## 2024-09-04 LAB — FSH SERPL-ACNC: 4.8 MIU/ML

## 2024-09-04 PROCEDURE — 3008F BODY MASS INDEX DOCD: CPT | Performed by: FAMILY MEDICINE

## 2024-09-04 PROCEDURE — 3074F SYST BP LT 130 MM HG: CPT | Performed by: FAMILY MEDICINE

## 2024-09-04 PROCEDURE — 99396 PREV VISIT EST AGE 40-64: CPT | Performed by: FAMILY MEDICINE

## 2024-09-04 PROCEDURE — 3078F DIAST BP <80 MM HG: CPT | Performed by: FAMILY MEDICINE

## 2024-09-04 RX ORDER — ONDANSETRON 4 MG/1
4 TABLET, ORALLY DISINTEGRATING ORAL EVERY 12 HOURS PRN
Qty: 20 TABLET | Refills: 1 | Status: SHIPPED | OUTPATIENT
Start: 2024-09-04

## 2024-09-04 RX ORDER — RIZATRIPTAN BENZOATE 10 MG/1
10 TABLET, ORALLY DISINTEGRATING ORAL AS NEEDED
Qty: 10 TABLET | Refills: 11 | Status: SHIPPED | OUTPATIENT
Start: 2024-09-04 | End: 2025-09-04

## 2024-09-04 NOTE — PROGRESS NOTES
HPI:   Rico Hurst is a 48 year old female who presents for a complete physical exam.    Now getting migraines once a week. Did see neuro and given sumatriptan but did not help. Exercising regularly again. Has one ovary after hysterectomy.     Wt Readings from Last 3 Encounters:   24 130 lb 12.8 oz (59.3 kg)   24 128 lb (58.1 kg)   24 131 lb (59.4 kg)     Body mass index is 24.71 kg/m².       Current Outpatient Medications   Medication Sig Dispense Refill    ibuprofen 600 MG Oral Tab Take 1 tablet (600 mg total) by mouth every 8 (eight) hours as needed for Pain. 60 tablet 0    Multiple Vitamin (MULTI VITAMIN DAILY OR) Take by mouth. (Patient not taking: Reported on 2024)        Past Medical History:    History of blood transfusion    [employer]    PONV (postoperative nausea and vomiting)      Past Surgical History:   Procedure Laterality Date    Appendectomy      Appendectomy            Colonoscopy N/A 2023    Procedure: COLONOSCOPY;  Surgeon: Kb Yip MD;  Location: [facility] ENDO    Hysterectomy      supracervical hysterectomy, RSO    Myomectomy 5/> intramural myomas &/or total wt >250 gms, abdominal approach      x 4    Other surgical history        No family history on file.   Social History:   Social History     Socioeconomic History    Marital status:    Tobacco Use    Smoking status: Never    Smokeless tobacco: Never   Vaping Use    Vaping status: Never Used   Substance and Sexual Activity    Alcohol use: Yes     Alcohol/week: 2.0 standard drinks of alcohol     Types: 2 Glasses of wine per week    Drug use: Not Currently     Types: Cannabis    Sexual activity: Yes     Birth control/protection: Hysterectomy     Social Determinants of Health      Received [facility] [employer], [facility]          REVIEW OF SYSTEMS:   GENERAL: feels well otherwise  Review of Systems   See HPI   EXAM:   BP 95/63   Pulse 59   Ht 5' 1\" (1.549 m)   Wt 130 lb 12.8  oz (59.3 kg)   LMP 02/12/2023   BMI 24.71 kg/m²     GENERAL: well developed, well nourished,in no apparent distress  SKIN: no rashes,no suspicious lesions  HEENT: atraumatic, normocephalic,ears and throat are clear  EYES:PERRLA, EOMI, conjunctiva are clear  LUNGS: clear to auscultation  CARDIO: RRR without murmur  GI: good BS's,no masses, HSM or tenderness  EXTREMITIES: no cyanosis, clubbing or edema  NEURO: Oriented times three,cranial nerves are intact,motor and sensory are grossly intact    ASSESSMENT AND PLAN:   Rico Hurst is a 48 year old female who presents for a complete physical exam.    1. Routine medical exam    - FSH [E]; Future    2. Weight gain    - FSH [E]; Future    3. S/P hysterectomy      4. Intractable migraine without status migrainosus, unspecified migraine type  Migrelief daily -  maxalt and zofran prn.        Lizz Winslow MD  9/4/2024  5:46 PM

## 2024-09-05 NOTE — PROGRESS NOTES
FSH is actually quite low so you are not in menopause but that does not mean you are not experiencing the start of changes that leads to perimenopause. - Dr. Winslow

## 2024-09-16 ENCOUNTER — HOSPITAL ENCOUNTER (OUTPATIENT)
Dept: MAMMOGRAPHY | Age: 49
Discharge: HOME | End: 2024-09-16
Attending: FAMILY MEDICINE
Payer: COMMERCIAL

## 2024-09-16 DIAGNOSIS — Z12.31 BREAST CANCER SCREENING BY MAMMOGRAM: ICD-10-CM

## 2024-09-16 PROCEDURE — 77063 BREAST TOMOSYNTHESIS BI: CPT | Performed by: FAMILY MEDICINE

## 2024-09-16 PROCEDURE — 77067 SCR MAMMO BI INCL CAD: CPT | Performed by: FAMILY MEDICINE

## 2024-09-19 ENCOUNTER — TELEPHONE (OUTPATIENT)
Dept: FAMILY MEDICINE CLINIC | Facility: CLINIC | Age: 49
End: 2024-09-19

## 2024-09-19 NOTE — TELEPHONE ENCOUNTER
Called patient verified full name and . Informed patient the form was signed and ready for pickup. Patient verbalized understanding and did not have any further questions.

## 2025-02-20 ENCOUNTER — OFFICE VISIT (OUTPATIENT)
Dept: OBGYN CLINIC | Facility: CLINIC | Age: 50
End: 2025-02-20

## 2025-02-20 VITALS
SYSTOLIC BLOOD PRESSURE: 112 MMHG | HEIGHT: 61 IN | DIASTOLIC BLOOD PRESSURE: 79 MMHG | HEART RATE: 69 BPM | BODY MASS INDEX: 24.62 KG/M2 | WEIGHT: 130.38 LBS

## 2025-02-20 DIAGNOSIS — Z01.419 WELL WOMAN EXAM WITH ROUTINE GYNECOLOGICAL EXAM: Primary | ICD-10-CM

## 2025-02-20 PROCEDURE — 3008F BODY MASS INDEX DOCD: CPT | Performed by: NURSE PRACTITIONER

## 2025-02-20 PROCEDURE — 3074F SYST BP LT 130 MM HG: CPT | Performed by: NURSE PRACTITIONER

## 2025-02-20 PROCEDURE — 99396 PREV VISIT EST AGE 40-64: CPT | Performed by: NURSE PRACTITIONER

## 2025-02-20 PROCEDURE — 3078F DIAST BP <80 MM HG: CPT | Performed by: NURSE PRACTITIONER

## 2025-02-21 NOTE — PROGRESS NOTES
[facility]    Obstetrics and Gynecology    Chief Complaint   Patient presents with    Gyn Exam     ANNUAL EXAM       Rico Hurst is a 49 year old female  Patient's last menstrual period was 2023. presenting for annual gynecology exam.  She had surgery with Dr. Blanchard 2024 Xi robotic extensive lysis of adhesions, left oophorectomy, removal of 7cm peritoneal cyst, trachelectomy, left ureterolysis, left lymph node sampling     No cancer on pathology - hemorrhagic cyst.  Feeling well since surgery. Still has one ovary - right per operative records.    No vasomotor symptoms  She is sexually active since last visit, has monogamous, no concerns with intercourse.   desires vaginal sti testing.     Hx of supracervical hysterectomy in summer of , due to fibroids and hx of myomectomy.     Exercising regularly. No pelvic pain, no bowel or bladder concerns.  Moods good.     Pap:2023 normal, negative human papillomavirus  2020 NILM, neg HPV   NILM  Hx of abnormal pap in her 20s and repeat normal.     Mammo: 2024 normal  Colonoscopy: rev'd new guidelines - scheduled in march        OBSTETRICS HISTORY:  OB History    Para Term  AB Living   1 0 0 0 1 0   SAB IAB Ectopic Multiple Live Births   1 0 0 0 0       GYNE HISTORY:  Menarche: 15 (2025  6:08 PM)  Use of Birth Control (if yes, specify type): Hysterectomy (partial hsyterectomy) (2025  6:08 PM)  Date When Birth Control Last Used: partial hysterectomy (2025  6:08 PM)  Hx Prior Abnormal Pap: Yes (2025  6:08 PM)  Pap Date: 23 (2025  6:08 PM)  Pap Result Notes: NEG HPV NEG (2025  6:08 PM)      History   Sexual Activity    Sexual activity: Yes    Birth control/ protection: Hysterectomy           Latest Ref Rng & Units 2023    11:18 AM 2020    10:24 AM 2017     1:38 PM   RECENT PAP RESULTS   INTERPRETATION/RESULT: Negative for intraepithelial lesion or malignancy Negative for  intraepithelial lesion or malignancy  Negative for intraepithelial lesion or malignancy  Negative for intraepithelial lesion or malignancy    HPV Negative Negative  Negative  Negative          MEDICAL HISTORY:  Past Medical History:    History of blood transfusion    [employer]    PONV (postoperative nausea and vomiting)     Past Surgical History:   Procedure Laterality Date    Appendectomy      Appendectomy            Colonoscopy N/A 2023    Procedure: COLONOSCOPY;  Surgeon: Kb Yip MD;  Location: [facility] ENDO    Hysterectomy      supracervical hysterectomy, RSO    Myomectomy 5/> intramural myomas &/or total wt >250 gms, abdominal approach      x 4    Other surgical history         SOCIAL HISTORY:  Social History     Socioeconomic History    Marital status:      Spouse name: Not on file    Number of children: Not on file    Years of education: Not on file    Highest education level: Not on file   Occupational History    Not on file   Tobacco Use    Smoking status: Never    Smokeless tobacco: Never   Vaping Use    Vaping status: Never Used   Substance and Sexual Activity    Alcohol use: Yes     Alcohol/week: 2.0 standard drinks of alcohol     Types: 2 Glasses of wine per week    Drug use: Not Currently     Types: Cannabis    Sexual activity: Yes     Birth control/protection: Hysterectomy   Other Topics Concern    Not on file   Social History Narrative    Not on file     Social Drivers of Health     Food Insecurity: Not on file   Transportation Needs: Not on file   Stress: Not on file   Housing Stability: At Risk (2023)    Received from [employer], [facility]     Living Situation: Not on file     Housing Problems: Not on file         Depression Screening (PHQ-2/PHQ-9): Over the LAST 2 WEEKS   Little interest or pleasure in doing things: Not at all    Feeling down, depressed, or hopeless: Not at all    PHQ-2 SCORE: 0           FAMILY HISTORY:  History reviewed.  No pertinent family history.    MEDICATIONS:    Current Outpatient Medications:     rizatriptan (MAXALT-MLT) 10 MG Oral Tablet Dispersible, Take 1 tablet (10 mg total) by mouth as needed for Migraine., Disp: 10 tablet, Rfl: 11    ondansetron 4 MG Oral Tablet Dispersible, Take 1 tablet (4 mg total) by mouth every 12 (twelve) hours as needed for Nausea., Disp: 20 tablet, Rfl: 1    ibuprofen 600 MG Oral Tab, Take 1 tablet (600 mg total) by mouth every 8 (eight) hours as needed for Pain., Disp: 60 tablet, Rfl: 0    Multiple Vitamin (MULTI VITAMIN DAILY OR), Take by mouth., Disp: , Rfl:     ALLERGIES:  Allergies[1]      Review of Systems:  Constitutional:  Denies fatigue, night sweats, hot flashes  Eyes:  denies blurred or double vision  Cardiovascular:  denies chest pain or palpitations  Respiratory:  denies shortness of breath  Gastrointestinal:  denies heartburn, abdominal pain, diarrhea or constipation  Genitourinary:  denies dysuria, incontinence, abnormal vaginal discharge, vaginal itching,   Musculoskeletal:  denies back pain   Skin/Breast:  Denies any breast pain, lumps, or discharge.   Neurological:  denies headaches, extremity weakness or numbness.  Psychiatric: denies depression or anxiety.  Endocrine:   denies excessive thirst or urination.  Heme/Lymph:  denies history of anemia, easy bruising or bleeding.      PHYSICAL EXAM:     Vitals:    02/20/25 1809   BP: 112/79   Pulse: 69   Weight: 130 lb 6.4 oz (59.1 kg)   Height: 5' 1\" (1.549 m)       Body mass index is 24.64 kg/m².     Patient offered chaperone, patient declined    Constitutional: well developed, well nourished  Psychiatric:  Oriented to time, place, person and situation. Appropriate mood and affect  Head/Face: normocephalic  Neck/Thyroid: thyroid symmetric, no thyromegaly, no nodules, no adenopathy  Lymphatic:no abnormal supraclavicular or axillary adenopathy is noted  Breast: normal without palpable masses, tenderness, asymmetry, nipple discharge,  nipple retraction or skin changes  Abdomen:  soft, nontender, nondistended, no masses  Skin/Hair: no unusual rashes or bruises  Extremities: no edema, no cyanosis    Pelvic Exam:  External Genitalia: normal appearance, hair distribution, and no lesions  Urethral Meatus:  normal in size, location, without lesions and prolapse  Bladder:  No fullness, masses or tenderness  Vagina:  Normal appearance without lesions, no abnormal discharge  Cervix:  absent  Uterus: absent  Adnexa: normal without masses or tenderness  Perineum: normal  Anus: no hemorroids     Assessment & Plan:    ICD-10-CM    1. Well woman exam with routine gynecological exam  Z01.419          Reviewed ASCCP guidelines with the patient   Pap no longer indicated  Breast Health:     Reviewed current guidelines with the patient and to start Mammograms at age 40  Reviewed monthly self breast exams with the patient   Discussed diet, exercise, MVIs with Ca/Vit D  Follow up in 1 yr for WWKASSANDRA Gonzalez    This note was prepared using Dragon Medical voice recognition dictation software. As a result errors may occur. When identified these errors have been corrected. While every attempt is made to correct errors during dictation discrepancies may still exist.         [1] No Known Allergies

## 2025-07-02 ENCOUNTER — TELEPHONE (OUTPATIENT)
Dept: PHYSICAL MEDICINE AND REHAB | Facility: CLINIC | Age: 50
End: 2025-07-02

## 2025-07-02 ENCOUNTER — OFFICE VISIT (OUTPATIENT)
Dept: NEUROLOGY | Facility: CLINIC | Age: 50
End: 2025-07-02
Payer: COMMERCIAL

## 2025-07-02 DIAGNOSIS — G43.709 CHRONIC MIGRAINE W/O AURA W/O STATUS MIGRAINOSUS, NOT INTRACTABLE: ICD-10-CM

## 2025-07-02 DIAGNOSIS — G43.109 MIGRAINE WITH AURA AND WITHOUT STATUS MIGRAINOSUS, NOT INTRACTABLE: Primary | ICD-10-CM

## 2025-07-02 PROCEDURE — 99214 OFFICE O/P EST MOD 30 MIN: CPT | Performed by: OTHER

## 2025-07-02 RX ORDER — TOPIRAMATE 25 MG/1
TABLET, FILM COATED ORAL
Qty: 270 TABLET | Refills: 3 | Status: SHIPPED | OUTPATIENT
Start: 2025-07-02

## 2025-07-02 NOTE — PROGRESS NOTES
[address], [address]  [facility] [address]  [phone]            Neurology follow-up visit     Referred By: Dr. Baez ref. provider found    Chief Complaint:   Chief Complaint   Patient presents with    Neurologic Problem     LOV 2/29/2024 Seen for New onset headache.     Patient presents here today for late follow-up, patient reports having headache. Reports would like to discuss medication to prevent migraines instead of treating them after they occur.  Denies dizziness, nausea.   Patient gives verbal consent to use [facility]...        HPI:     Rico Hurst is a 49 year old female, who presents for history of new onset headaches.  Since 2023 patient description of headaches.  She has never had them before.  Essentially always on the right side, lasting for at least few hours.  Typically sleep will get better.  She feels nauseated with some of them, with most severe ones show with careful light sensitive, no sensitivity, it would last through the next day sometimes.  Symptoms she feels, or feeling in the right side of the temple.  She denies any focal neurological symptoms otherwise.  No numbness, no tingling, no weakness, no trouble with speech or vision.  She did have some ovaries removed due to the cyst and actually since then her headaches became less frequent.  She was prescribed sumatriptan but has not had a chance to try it yet.  Patient was prescribed rizatriptan and Zofran for rescue.  Patient did not use any preventive medications at that time.    Patient came back for follow up in July 2025.     Rico Hurst is a 49 year old female who presents with chronic migraines seeking preventive treatment options.    She experiences severe, debilitating headaches that occur intermittently, with episodes happening two to three times a week, followed by a week without headaches. These headaches significantly impact her ability to function normally.  Patient  with average about 15 headache days a month, and at least 8 of them are migraines.  They would last for at least 4 hours if patient does not take any rescue medications.    She has been using a rescue medication for her headaches, but it has not been effective in providing relief. She experiences nausea with her current medication, even when taking Zofran for anti-nausea, which makes oral medications less desirable for her.    She always has low blood pressure but does not experience symptoms from it. She engages in regular physical activity, specifically fast walking, which she describes as between a walk and a jog. She has young children and needs to maintain a high level of activity during the day.    She is interested in exploring preventive treatments that have minimal side effects, as she is concerned about potential side effects of medications, particularly those that cause sleepiness, given her need to be active with her children.         Past Medical History:    History of blood transfusion    [employer]    PONV (postoperative nausea and vomiting)       Past Surgical History:   Procedure Laterality Date    Appendectomy      Appendectomy            Colonoscopy N/A 2023    Procedure: COLONOSCOPY;  Surgeon: Kb Yip MD;  Location: [facility] ENDO    Hysterectomy      supracervical hysterectomy, RSO    Myomectomy 5/> intramural myomas &/or total wt >250 gms, abdominal approach      x 4    Other surgical history         Social history:  History   Smoking Status    Never   Smokeless Tobacco    Never     History   Alcohol Use    2.0 standard drinks of alcohol/week    2 Glasses of wine per week     History   Drug Use Unknown       No family history on file.      Current Outpatient Medications:     rizatriptan (MAXALT-MLT) 10 MG Oral Tablet Dispersible, Take 1 tablet (10 mg total) by mouth as needed for Migraine., Disp: 10 tablet, Rfl: 11    ondansetron 4 MG Oral Tablet Dispersible, Take 1 tablet  (4 mg total) by mouth every 12 (twelve) hours as needed for Nausea., Disp: 20 tablet, Rfl: 1    ibuprofen 600 MG Oral Tab, Take 1 tablet (600 mg total) by mouth every 8 (eight) hours as needed for Pain., Disp: 60 tablet, Rfl: 0    Multiple Vitamin (MULTI VITAMIN DAILY OR), Take by mouth., Disp: , Rfl:     No Known Allergies    ROS:   As in HPI, the rest of the 14 system review was done and was negative      Physical Exam:  There were no vitals filed for this visit.      General: No apparent distress, well nourished, well groomed.  Head- Normocephalic, atraumatic  Eyes- No redness or swelling  ENT- Hearing intake, normal glutition  Neck- No masses or adenopathy  Cv: pulses were palpable and normal, no cyanosis or edema     Neurological:     Mental Status- Alert and oriented x3.  Normal attention span and concentration  Thought process intact  Memory intact- recent and remote  Mood intact  Fund of knowledge appropriate for education and age    Language intact including: comprehension, naming, repetition, vocabulary    Cranial Nerves:  II.- Visual fields full to confrontation  V. Facial sensation intact  VII. Face symmetric, no facial weakness  VIII. Hearing intact to whisper.  IX. Pallet elevates symmetrically.  XI. Shoulder shrug is intact  XII. Tongue is midline    Motor Exam:  Muscle tone normal  No atrophy or fasciculations  Strength- upper extremities 5/5 proximally and distally                  - lower  extremities 5/5 proximally and distally    Sensory Exam:  Light touch sensation- intact in all 4 extremities      Coordination:  Finger to nose intact  Rapid alternating movements intact    Gait:  Normal posture  Normal physiologic      Labs:    Lab Results   Component Value Date    TSH 1.712 09/03/2024     Lab Results   Component Value Date    HDL 48 09/03/2024    LDL 94 09/03/2024    TRIG 78 09/03/2024     Lab Results   Component Value Date    HGB 13.2 09/03/2024    HCT 38.3 09/03/2024    MCV 85.5 09/03/2024     WBC 6.1 09/03/2024    .0 09/03/2024      Lab Results   Component Value Date    BUN 16 09/03/2024    CA 10.1 09/03/2024    ALT <7 (L) 09/03/2024    AST 11 09/03/2024    ALB 4.4 09/03/2024     09/03/2024    K 4.7 09/03/2024     09/03/2024    CO2 26.0 09/03/2024      I have reviewed labs.      Assessment   1. New onset headache  Patient with chronic migraines.  This time patient feels that her quality of life is significantly impaired, therefore she is decided to start preventive treatments.  Will do topiramate, if it is unsuccessful might consider doing Botox as well.  Continue with rizatriptan in the meantime.    2. Migraine with aura and without status migrainosus, not intractable       Current rescue medication ineffective, nausea persists despite anti-nausea treatment.  - Initiate topiramate: one pill at night for first week, increase to two pills at night in second week, three pills at night in third week.  - Order Botox treatment, await insurance approval.  - Schedule follow-up in one month to assess treatment effectiveness and adjust plan based on headache tracking and side effects.                 Education and counseling provided to patient. Instructed patient to call my office or seek medical attention immediately if symptoms worsen.  Patient verbalized understanding of information given. All questions were answered. All side effects of drugs were discussed.     Return to clinic in: No follow-ups on file.    Fredrick Adams MD

## 2025-07-02 NOTE — TELEPHONE ENCOUNTER
Initiated authorization for Botox 200 units. [facility] [id number] dx:G47.835 with [employer] portal.    Clinicals faxed/uploaded to [employer] portal.  Status: Pending  Case # [id number]

## 2025-07-07 NOTE — TELEPHONE ENCOUNTER
Reviewed [employer] portal and the prior auth is still pending medical review.  Will continue to follow up accordingly.

## 2025-07-16 ENCOUNTER — MED REC SCAN ONLY (OUTPATIENT)
Dept: PHYSICAL MEDICINE AND REHAB | Facility: CLINIC | Age: 50
End: 2025-07-16

## (undated) DIAGNOSIS — R30.0 BURNING WITH URINATION: Primary | ICD-10-CM

## (undated) DEVICE — SCISSORS SURG DIA8MM MPLR CRV ENDOWRIST

## (undated) DEVICE — SOLUTION IRRIG 1000ML 0.9% NACL USP BTL

## (undated) DEVICE — GLOVE GAMMEX PI HYBRID LF 7.5

## (undated) DEVICE — TRAY PREP WET SKIN 4 COMPARTMENT 6 SPNG 2 TWL

## (undated) DEVICE — SNARE OPTMZ PLPCTM TRP

## (undated) DEVICE — SYSTEM SEMI RIG LNR 3000CC W/ EL AND SELF

## (undated) DEVICE — COVER TIP DIA8MM FOR DA VINCI SI XI X SYS

## (undated) DEVICE — MEDI-VAC NON-CONDUCTIVE SUCTION TUBING 6MM X 1.8M (6FT.) L: Brand: CARDINAL HEALTH

## (undated) DEVICE — COUNTER NDL 10 CT HLD 20 FOAM BLK ADH

## (undated) DEVICE — KIT CLEAN ENDOKIT 1.1OZ GOWNX2

## (undated) DEVICE — PACK CDS ROBOTIC

## (undated) DEVICE — JELLY LUB 2OZ GREASELESS FLIP TOP DISP

## (undated) DEVICE — FORCEPS ROBOTIC DIA8MM BPLR FEN XI ENDOWRIST

## (undated) DEVICE — Device: Brand: DUAL NARE NASAL CANNULAE FEMALE LUER CON 7FT O2 TUBE

## (undated) DEVICE — GLOVE ENCR MICRO S87 7.0

## (undated) DEVICE — DRIVER NDL DIA8MM MEGA SUT CUT XI ENDOWRIST

## (undated) DEVICE — DRAPE EXT W21XH19XL10.5IN DA VINCI XI

## (undated) DEVICE — GOWN SURG XL DISP LEV 3 AERO BLU RAGLAN SL

## (undated) DEVICE — DRAPE SURG STERI ISOLATION LRG

## (undated) DEVICE — DRAPE SURG W109XL110IN UNIV STD ABD/PELV 100%

## (undated) DEVICE — Device

## (undated) DEVICE — APPLICATOR SKIN PREP 26ML HI LT ORNG 2% CHG

## (undated) DEVICE — SUTURE MCRYL SZ 4-0 L18IN ABSRB UD L19MM PS-2

## (undated) DEVICE — NEEDLE INSUF 13GA L120MM LAP SPR LD BLNT STYL

## (undated) DEVICE — TRAY CATH 16FR F INCLUDE BARDX IC COMPLT CARE

## (undated) DEVICE — PAD POS 36IN DISP SURGYPAD

## (undated) DEVICE — SUTURE V-LOC 180 SZ 0 L30CM ABSRB GRN L37MM

## (undated) DEVICE — PAD ALC 43.5X24IN PREP  LF

## (undated) DEVICE — LAPAROVUE VISIBILITY SYS

## (undated) DEVICE — SEAL ROBOTIC W10.375XH3.75XL4.3IN 0.88LB

## (undated) DEVICE — FORCEPS ROBOTIC DIA8MM PROGRASP XI ENDOWRIST

## (undated) DEVICE — SOLUTION IRRIG 3000ML 0.9% NACL FLX CONT

## (undated) DEVICE — HEXAGONAL CAPTIVATOR STIFF 13M

## (undated) DEVICE — STERILE LATEX POWDER-FREE SURGICAL GLOVESWITH NITRILE COATING: Brand: PROTEXIS

## (undated) DEVICE — HANDLE SUR BLU PLAS LT FLX SLIP ON ST DISP

## (undated) DEVICE — KIT ENDO ORCAPOD 160/180/190

## (undated) DEVICE — DRAPE EQUIP CLMN ROBOTIC DISP DA VINCI XI

## (undated) DEVICE — OBTURATOR ROBOTIC DIA8MM STD OPT BLDELSS

## (undated) DEVICE — SYSTEM ACCS W/ 120MM OBT 5MM PRT LP BLDELSS

## (undated) DEVICE — ADHESIVE SKIN TOP FOR WND CLSR DERMBND ADV

## (undated) DEVICE — SET SMK EVAC TB TRILUMEN FLTR ACT CHAR FLTR

## (undated) DEVICE — SUTURE PROL SZ 0 L30IN NONABSORBABLE BLU .

## (undated) DEVICE — 60 ML SYRINGE REGULAR TIP: Brand: MONOJECT

## (undated) DEVICE — STAPLER SKIN ETHICON GUN PXW35

## (undated) NOTE — LETTER
08/17/21        Sherryle Coil  [address]      Dear Jeanie Fabry records indicate that you have outstanding lab work and or testing that was ordered for you and has not yet been completed:  Orders Placed This Encounter      CBC W

## (undated) NOTE — LETTER
11/17/21        Raphael Sherman  [address]      Dear Xiang Must records indicate that you have outstanding lab work and or testing that was ordered for you and has not yet been completed:  Orders Placed This Encounter      CBC W

## (undated) NOTE — LETTER
2/12/2018              Neda Marinelli         Dear Ilsa Oates,    Your mammogram was normal. Please call to make an appointment for annual exam and repeat mammogram in one year.  If you have any questions or con

## (undated) NOTE — MR AVS SNAPSHOT
[place]  [address]  [phone]               Thank you for choosing us for your health care visit with Beny Rascon MD.  We are glad to serve you and happy to provide you with this summary of visit,  view other health information, and more. To sign up or find more information, go to [email or web address] and click on the Sign Up Now link in the [employer] box.      Enter your [employer] Activation Code exactly as it appears below along with yo Don’t forget strength training with weights and resistance Set goals and track your progress   You don’t need to join a gym. Home exercises work great.  Put more priority on exercise in your life                    Visit [facility] online at

## (undated) NOTE — MR AVS SNAPSHOT
After Visit Summary   1/29/2020    Hasmukh Yeboah    MRN: [id number]           Visit Information     Date & Time  1/29/2020  9:20 AM Provider  Ayden Garza MD [facility], [address] [place] [address],[address], [facility]/[employer].  Phone  [phone] It is the patient's responsibility to check with and follow their insurance company's guidelines for prior authorization for this test.  You may be held responsible for payment in full if proper authorization is not acquired.   Please contact the Patient Bu 2 ½ hours per week – spread out over time Use a lisa to keep you motivated   Don’t forget strength training with weights and resistance Set goals and track your progress   You don’t need to join a gym. Home exercises work great.  Put more priority on exe Treatment for acute illness   or injury that are   non-life-threatening.   Also available by appointment     Average cost  $70*   [address]   Monday – Friday  10:00 am – 10:00

## (undated) NOTE — LETTER
05/17/21    Pamela Carcamo  [address]      Dear Jon Dale records indicate that you have outstanding lab work and or testing that was ordered for you and has not yet been completed:  Orders Placed This Encounter      CBC W Diff

## (undated) NOTE — Clinical Note
1/12/2017              Pamela Carcamo        [address] [place] [id number]         Dear Lang Haas,      It was a pleasure to see you at our [address] [facility] office.   Your Pap and HPV are normal, return to

## (undated) NOTE — ED AVS SNAPSHOT
[facility] in [address] [address] Yoni Alonso    Phone:  [phone]    Fax:  [phone]           Virgilio Rodas   MRN: [id number]    Department:  [facility] in [address] [place] [address]   Date of Visit:  1/14 SALIVARY GLAND SWELLING, UNCERTAIN CAUSE (ENGLISH)      Disclosure     Insurance plans vary and the physician(s) referred by the Immediate Care may not be covered by your plan.   It is possible that the physician may not participate in your health insuranc IF THERE IS ANY CHANGE OR WORSENING OF YOUR CONDITION, CALL YOUR PRIMARY CARE PHYSICIAN AT ONCE OR GO TO THE EMERGENCY DEPARTMENT.     If you have been prescribed any medication(s), please fill your prescription right away and begin taking the medication(s) harming yourself, contact [facility] at [phone]. - If you don’t have insurance, Faviola Porras has partnered with [facility] [address] to help you get signed up for insurance coverage.   [facility] [place]